# Patient Record
Sex: FEMALE | Race: WHITE | Employment: PART TIME | URBAN - METROPOLITAN AREA
[De-identification: names, ages, dates, MRNs, and addresses within clinical notes are randomized per-mention and may not be internally consistent; named-entity substitution may affect disease eponyms.]

---

## 2019-07-25 ENCOUNTER — APPOINTMENT (OUTPATIENT)
Dept: CT IMAGING | Age: 34
End: 2019-07-25
Payer: COMMERCIAL

## 2019-07-25 ENCOUNTER — HOSPITAL ENCOUNTER (EMERGENCY)
Age: 34
Discharge: HOME | End: 2019-07-25
Payer: COMMERCIAL

## 2019-07-25 VITALS
TEMPERATURE: 97.9 F | RESPIRATION RATE: 15 BRPM | WEIGHT: 205 LBS | OXYGEN SATURATION: 100 % | BODY MASS INDEX: 40.25 KG/M2 | HEIGHT: 60 IN | HEART RATE: 67 BPM | DIASTOLIC BLOOD PRESSURE: 76 MMHG | SYSTOLIC BLOOD PRESSURE: 109 MMHG

## 2019-07-25 DIAGNOSIS — F45.8 HYPERVENTILATION SYNDROME: Primary | ICD-10-CM

## 2019-07-25 LAB
ALBUMIN SERPL-MCNC: 4 G/DL (ref 3.5–4.6)
ALP BLD-CCNC: 70 U/L (ref 40–130)
ALT SERPL-CCNC: 43 U/L (ref 0–33)
ANION GAP SERPL CALCULATED.3IONS-SCNC: 11 MEQ/L (ref 9–15)
AST SERPL-CCNC: 28 U/L (ref 0–35)
BACTERIA: NEGATIVE /HPF
BASOPHILS ABSOLUTE: 0.1 K/UL (ref 0–0.2)
BASOPHILS RELATIVE PERCENT: 1.2 %
BILIRUB SERPL-MCNC: <0.2 MG/DL (ref 0.2–0.7)
BILIRUBIN URINE: NEGATIVE
BLOOD, URINE: ABNORMAL
BUN BLDV-MCNC: 13 MG/DL (ref 6–20)
CALCIUM SERPL-MCNC: 9 MG/DL (ref 8.5–9.9)
CHLORIDE BLD-SCNC: 104 MEQ/L (ref 95–107)
CLARITY: CLEAR
CO2: 24 MEQ/L (ref 20–31)
COLOR: YELLOW
CREAT SERPL-MCNC: 0.62 MG/DL (ref 0.5–0.9)
D DIMER: 0.33 MG/L FEU (ref 0–0.5)
EKG ATRIAL RATE: 72 BPM
EKG P AXIS: 14 DEGREES
EKG P-R INTERVAL: 128 MS
EKG Q-T INTERVAL: 398 MS
EKG QRS DURATION: 82 MS
EKG QTC CALCULATION (BAZETT): 435 MS
EKG R AXIS: 21 DEGREES
EKG T AXIS: 18 DEGREES
EKG VENTRICULAR RATE: 72 BPM
EOSINOPHILS ABSOLUTE: 0.2 K/UL (ref 0–0.7)
EOSINOPHILS RELATIVE PERCENT: 1.6 %
EPITHELIAL CELLS, UA: ABNORMAL /HPF (ref 0–5)
GFR AFRICAN AMERICAN: >60
GFR NON-AFRICAN AMERICAN: >60
GLOBULIN: 3.4 G/DL (ref 2.3–3.5)
GLUCOSE BLD-MCNC: 118 MG/DL (ref 70–99)
GLUCOSE URINE: NEGATIVE MG/DL
HCT VFR BLD CALC: 34.9 % (ref 37–47)
HEMOGLOBIN: 11.6 G/DL (ref 12–16)
HYALINE CASTS: ABNORMAL /HPF (ref 0–5)
KETONES, URINE: NEGATIVE MG/DL
LACTIC ACID: 1.2 MMOL/L (ref 0.5–2.2)
LEUKOCYTE ESTERASE, URINE: NEGATIVE
LYMPHOCYTES ABSOLUTE: 2.5 K/UL (ref 1–4.8)
LYMPHOCYTES RELATIVE PERCENT: 25.4 %
MAGNESIUM: 2.1 MG/DL (ref 1.7–2.4)
MCH RBC QN AUTO: 25.3 PG (ref 27–31.3)
MCHC RBC AUTO-ENTMCNC: 33.1 % (ref 33–37)
MCV RBC AUTO: 76.3 FL (ref 82–100)
MONOCYTES ABSOLUTE: 0.4 K/UL (ref 0.2–0.8)
MONOCYTES RELATIVE PERCENT: 4.4 %
NEUTROPHILS ABSOLUTE: 6.6 K/UL (ref 1.4–6.5)
NEUTROPHILS RELATIVE PERCENT: 67.4 %
NITRITE, URINE: NEGATIVE
PDW BLD-RTO: 14.8 % (ref 11.5–14.5)
PH UA: 6 (ref 5–9)
PLATELET # BLD: 383 K/UL (ref 130–400)
POTASSIUM SERPL-SCNC: 3.8 MEQ/L (ref 3.4–4.9)
PROTEIN UA: 30 MG/DL
RBC # BLD: 4.58 M/UL (ref 4.2–5.4)
RBC UA: ABNORMAL /HPF (ref 0–5)
SODIUM BLD-SCNC: 139 MEQ/L (ref 135–144)
SPECIFIC GRAVITY UA: 1.01 (ref 1–1.03)
TOTAL CK: 145 U/L (ref 0–170)
TOTAL PROTEIN: 7.4 G/DL (ref 6.3–8)
TROPONIN: <0.01 NG/ML (ref 0–0.01)
URINE REFLEX TO CULTURE: YES
UROBILINOGEN, URINE: 0.2 E.U./DL
WBC # BLD: 9.7 K/UL (ref 4.8–10.8)
WBC UA: ABNORMAL /HPF (ref 0–5)

## 2019-07-25 PROCEDURE — 93005 ELECTROCARDIOGRAM TRACING: CPT | Performed by: PHYSICIAN ASSISTANT

## 2019-07-25 PROCEDURE — 99284 EMERGENCY DEPT VISIT MOD MDM: CPT

## 2019-07-25 PROCEDURE — 87086 URINE CULTURE/COLONY COUNT: CPT

## 2019-07-25 PROCEDURE — 81001 URINALYSIS AUTO W/SCOPE: CPT

## 2019-07-25 PROCEDURE — 84484 ASSAY OF TROPONIN QUANT: CPT

## 2019-07-25 PROCEDURE — 82550 ASSAY OF CK (CPK): CPT

## 2019-07-25 PROCEDURE — 6370000000 HC RX 637 (ALT 250 FOR IP): Performed by: EMERGENCY MEDICINE

## 2019-07-25 PROCEDURE — 85025 COMPLETE CBC W/AUTO DIFF WBC: CPT

## 2019-07-25 PROCEDURE — 83605 ASSAY OF LACTIC ACID: CPT

## 2019-07-25 PROCEDURE — 85379 FIBRIN DEGRADATION QUANT: CPT

## 2019-07-25 PROCEDURE — 2580000003 HC RX 258: Performed by: PHYSICIAN ASSISTANT

## 2019-07-25 PROCEDURE — 83735 ASSAY OF MAGNESIUM: CPT

## 2019-07-25 PROCEDURE — 36415 COLL VENOUS BLD VENIPUNCTURE: CPT

## 2019-07-25 PROCEDURE — 80053 COMPREHEN METABOLIC PANEL: CPT

## 2019-07-25 PROCEDURE — 70450 CT HEAD/BRAIN W/O DYE: CPT

## 2019-07-25 RX ORDER — LORAZEPAM 1 MG/1
1 TABLET ORAL ONCE
Status: COMPLETED | OUTPATIENT
Start: 2019-07-25 | End: 2019-07-25

## 2019-07-25 RX ORDER — 0.9 % SODIUM CHLORIDE 0.9 %
1000 INTRAVENOUS SOLUTION INTRAVENOUS ONCE
Status: COMPLETED | OUTPATIENT
Start: 2019-07-25 | End: 2019-07-25

## 2019-07-25 RX ORDER — KETOROLAC TROMETHAMINE 10 MG/1
10 TABLET, FILM COATED ORAL EVERY 6 HOURS PRN
Qty: 20 TABLET | Refills: 0 | Status: SHIPPED | OUTPATIENT
Start: 2019-07-25

## 2019-07-25 RX ADMIN — SODIUM CHLORIDE 1000 ML: 9 INJECTION, SOLUTION INTRAVENOUS at 01:13

## 2019-07-25 RX ADMIN — LORAZEPAM 1 MG: 1 TABLET ORAL at 02:20

## 2019-07-25 SDOH — HEALTH STABILITY: MENTAL HEALTH: HOW OFTEN DO YOU HAVE A DRINK CONTAINING ALCOHOL?: NEVER

## 2019-07-25 ASSESSMENT — ENCOUNTER SYMPTOMS
EYE PAIN: 0
ALLERGIC/IMMUNOLOGIC NEGATIVE: 1
CHEST TIGHTNESS: 1
ABDOMINAL PAIN: 0
COLOR CHANGE: 0
APNEA: 0
TROUBLE SWALLOWING: 0
SHORTNESS OF BREATH: 1

## 2019-07-26 LAB — URINE CULTURE, ROUTINE: NORMAL

## 2019-07-26 PROCEDURE — 93010 ELECTROCARDIOGRAM REPORT: CPT | Performed by: INTERNAL MEDICINE

## 2023-02-21 PROBLEM — K63.8219 SMALL INTESTINAL BACTERIAL OVERGROWTH: Status: ACTIVE | Noted: 2023-02-21

## 2023-02-21 PROBLEM — R13.10 DYSPHAGIA: Status: ACTIVE | Noted: 2023-02-21

## 2023-02-21 PROBLEM — R42 DIZZINESS: Status: ACTIVE | Noted: 2023-02-21

## 2023-02-21 PROBLEM — K21.9 GERD (GASTROESOPHAGEAL REFLUX DISEASE): Status: ACTIVE | Noted: 2023-02-21

## 2023-02-21 PROBLEM — D50.9 IRON DEFICIENCY ANEMIA: Status: ACTIVE | Noted: 2023-02-21

## 2023-02-21 PROBLEM — R05.9 COUGH: Status: ACTIVE | Noted: 2023-02-21

## 2023-02-21 PROBLEM — D64.9 ANEMIA: Status: ACTIVE | Noted: 2023-02-21

## 2023-02-21 PROBLEM — R10.30 LOWER ABDOMINAL PAIN: Status: ACTIVE | Noted: 2023-02-21

## 2023-02-21 PROBLEM — R06.02 SHORTNESS OF BREATH: Status: ACTIVE | Noted: 2023-02-21

## 2023-02-21 PROBLEM — J02.9 SORETHROAT: Status: ACTIVE | Noted: 2023-02-21

## 2023-02-21 PROBLEM — R09.89 THROAT TIGHTNESS: Status: ACTIVE | Noted: 2023-02-21

## 2023-02-21 PROBLEM — N39.0 UTI (URINARY TRACT INFECTION): Status: ACTIVE | Noted: 2023-02-21

## 2023-02-21 PROBLEM — F41.9 ANXIETY: Status: ACTIVE | Noted: 2023-02-21

## 2023-02-21 PROBLEM — R53.83 FATIGUE: Status: ACTIVE | Noted: 2023-02-21

## 2023-02-21 PROBLEM — J30.9 ALLERGIC RHINITIS: Status: ACTIVE | Noted: 2023-02-21

## 2023-02-21 PROBLEM — N93.8 DUB (DYSFUNCTIONAL UTERINE BLEEDING): Status: ACTIVE | Noted: 2023-02-21

## 2023-02-21 PROBLEM — J01.90 ACUTE SINUSITIS: Status: ACTIVE | Noted: 2023-02-21

## 2023-02-21 PROBLEM — J03.90 TONSILLITIS: Status: ACTIVE | Noted: 2023-02-21

## 2023-02-21 PROBLEM — G47.33 OBSTRUCTIVE SLEEP APNEA, ADULT: Status: ACTIVE | Noted: 2023-02-21

## 2023-02-21 PROBLEM — E55.9 VITAMIN D DEFICIENCY: Status: ACTIVE | Noted: 2023-02-21

## 2023-02-21 PROBLEM — R19.7 DIARRHEA: Status: ACTIVE | Noted: 2023-02-21

## 2023-02-21 PROBLEM — J45.909 ASTHMA (HHS-HCC): Status: ACTIVE | Noted: 2023-02-21

## 2023-02-21 RX ORDER — VIT C/E/ZN/COPPR/LUTEIN/ZEAXAN 250MG-90MG
1 CAPSULE ORAL DAILY
COMMUNITY
Start: 2022-11-15 | End: 2023-05-05 | Stop reason: SDUPTHER

## 2023-02-21 RX ORDER — ALBUTEROL SULFATE 90 UG/1
1-2 AEROSOL, METERED RESPIRATORY (INHALATION)
COMMUNITY
Start: 2021-08-16 | End: 2023-08-03 | Stop reason: SDUPTHER

## 2023-02-21 RX ORDER — ESCITALOPRAM OXALATE 10 MG/1
1 TABLET ORAL
COMMUNITY
Start: 2022-07-19 | End: 2023-10-30 | Stop reason: SDUPTHER

## 2023-02-21 RX ORDER — BENZONATATE 100 MG/1
100 CAPSULE ORAL 4 TIMES DAILY PRN
COMMUNITY
Start: 2022-06-21 | End: 2023-10-30 | Stop reason: SDUPTHER

## 2023-02-21 RX ORDER — NORETHINDRONE ACETATE AND ETHINYL ESTRADIOL .02; 1 MG/1; MG/1
1 TABLET ORAL DAILY
COMMUNITY
Start: 2021-07-22 | End: 2023-03-16 | Stop reason: SDUPTHER

## 2023-02-21 RX ORDER — PANTOPRAZOLE SODIUM 40 MG/1
1 TABLET, DELAYED RELEASE ORAL DAILY
COMMUNITY
Start: 2021-10-18 | End: 2023-08-03 | Stop reason: SDUPTHER

## 2023-02-21 RX ORDER — FLUTICASONE PROPIONATE AND SALMETEROL 500; 50 UG/1; UG/1
1 POWDER RESPIRATORY (INHALATION)
COMMUNITY
Start: 2022-07-19 | End: 2023-04-17 | Stop reason: SDUPTHER

## 2023-02-21 RX ORDER — ACETAMINOPHEN 500 MG
2 TABLET ORAL
COMMUNITY
Start: 2022-11-15

## 2023-02-21 RX ORDER — ALBUTEROL SULFATE 0.83 MG/ML
SOLUTION RESPIRATORY (INHALATION)
COMMUNITY
Start: 2022-07-19 | End: 2023-09-30 | Stop reason: SDUPTHER

## 2023-03-13 DIAGNOSIS — N93.8 DUB (DYSFUNCTIONAL UTERINE BLEEDING): ICD-10-CM

## 2023-03-16 RX ORDER — NORETHINDRONE ACETATE AND ETHINYL ESTRADIOL .02; 1 MG/1; MG/1
1 TABLET ORAL DAILY
Qty: 21 TABLET | Refills: 6 | Status: SHIPPED | OUTPATIENT
Start: 2023-03-16 | End: 2023-06-13 | Stop reason: SDUPTHER

## 2023-03-31 ENCOUNTER — OFFICE VISIT (OUTPATIENT)
Dept: PRIMARY CARE | Facility: CLINIC | Age: 38
End: 2023-03-31
Payer: COMMERCIAL

## 2023-03-31 VITALS
SYSTOLIC BLOOD PRESSURE: 122 MMHG | WEIGHT: 203 LBS | BODY MASS INDEX: 37.36 KG/M2 | OXYGEN SATURATION: 98 % | RESPIRATION RATE: 16 BRPM | DIASTOLIC BLOOD PRESSURE: 76 MMHG | HEIGHT: 62 IN | HEART RATE: 86 BPM

## 2023-03-31 DIAGNOSIS — J45.909 ASTHMA, UNSPECIFIED ASTHMA SEVERITY, UNSPECIFIED WHETHER COMPLICATED, UNSPECIFIED WHETHER PERSISTENT (HHS-HCC): Primary | ICD-10-CM

## 2023-03-31 DIAGNOSIS — Z01.419 GYNECOLOGIC EXAM NORMAL: ICD-10-CM

## 2023-03-31 DIAGNOSIS — N93.8 DUB (DYSFUNCTIONAL UTERINE BLEEDING): ICD-10-CM

## 2023-03-31 DIAGNOSIS — E66.01 OBESITY, CLASS III, BMI 40-49.9 (MORBID OBESITY) (MULTI): ICD-10-CM

## 2023-03-31 DIAGNOSIS — K21.9 GASTROESOPHAGEAL REFLUX DISEASE, UNSPECIFIED WHETHER ESOPHAGITIS PRESENT: ICD-10-CM

## 2023-03-31 DIAGNOSIS — G47.33 OBSTRUCTIVE SLEEP APNEA, ADULT: ICD-10-CM

## 2023-03-31 PROBLEM — E66.813 OBESITY, CLASS III, BMI 40-49.9 (MORBID OBESITY): Status: ACTIVE | Noted: 2018-04-23

## 2023-03-31 PROBLEM — D64.9 ANEMIA: Status: RESOLVED | Noted: 2023-02-21 | Resolved: 2023-03-31

## 2023-03-31 PROBLEM — R05.9 COUGH: Status: RESOLVED | Noted: 2023-02-21 | Resolved: 2023-03-31

## 2023-03-31 PROBLEM — N39.0 UTI (URINARY TRACT INFECTION): Status: RESOLVED | Noted: 2023-02-21 | Resolved: 2023-03-31

## 2023-03-31 PROBLEM — R42 DIZZINESS: Status: RESOLVED | Noted: 2023-02-21 | Resolved: 2023-03-31

## 2023-03-31 PROBLEM — J03.90 TONSILLITIS: Status: RESOLVED | Noted: 2023-02-21 | Resolved: 2023-03-31

## 2023-03-31 PROBLEM — J01.90 ACUTE SINUSITIS: Status: RESOLVED | Noted: 2023-02-21 | Resolved: 2023-03-31

## 2023-03-31 PROBLEM — K63.8219 SMALL INTESTINAL BACTERIAL OVERGROWTH: Status: RESOLVED | Noted: 2023-02-21 | Resolved: 2023-03-31

## 2023-03-31 PROBLEM — R10.30 LOWER ABDOMINAL PAIN: Status: RESOLVED | Noted: 2023-02-21 | Resolved: 2023-03-31

## 2023-03-31 PROBLEM — R19.7 DIARRHEA: Status: RESOLVED | Noted: 2023-02-21 | Resolved: 2023-03-31

## 2023-03-31 PROBLEM — J02.9 SORETHROAT: Status: RESOLVED | Noted: 2023-02-21 | Resolved: 2023-03-31

## 2023-03-31 PROBLEM — R06.02 SHORTNESS OF BREATH: Status: RESOLVED | Noted: 2023-02-21 | Resolved: 2023-03-31

## 2023-03-31 PROBLEM — R09.89 THROAT TIGHTNESS: Status: RESOLVED | Noted: 2023-02-21 | Resolved: 2023-03-31

## 2023-03-31 PROCEDURE — 99214 OFFICE O/P EST MOD 30 MIN: CPT | Performed by: FAMILY MEDICINE

## 2023-03-31 RX ORDER — TIOTROPIUM BROMIDE 18 UG/1
1 CAPSULE ORAL; RESPIRATORY (INHALATION)
Qty: 30 CAPSULE | Refills: 3 | Status: SHIPPED | OUTPATIENT
Start: 2023-03-31 | End: 2023-08-14 | Stop reason: SDUPTHER

## 2023-03-31 RX ORDER — ALPRAZOLAM 0.25 MG/1
0.25 TABLET ORAL 3 TIMES DAILY PRN
COMMUNITY
Start: 2022-07-29

## 2023-03-31 ASSESSMENT — ENCOUNTER SYMPTOMS
CHEST TIGHTNESS: 1
COUGH: 1
SHORTNESS OF BREATH: 1

## 2023-03-31 NOTE — PROGRESS NOTES
Subjective   Patient ID: Keri Lowe is a 37 y.o. female who presents for Asthma (Follow up. Patient c/o flare up over the last couple weeks with SOB on exertion.).    Asthma  She complains of chest tightness, cough and shortness of breath. This is a chronic problem. The current episode started more than 1 month ago. Her past medical history is significant for asthma.        Review of Systems   Respiratory:  Positive for cough and shortness of breath.      12 Systems have been reviewed as follows.  Constitutional: Fever, weight gain, weight loss, appetite change, night sweats, fatigue, chills.  Eyes : blurry, double vision, vision, loss, tearing, redness, pain, sensitivity to light, glaucoma.  Ears, nose, mouth, and throat: Hearing loss, ringing in the ears, ear pain, nasal congestion, nasal drainage, nosebleeds, mouth, throat, irritation tooth problem.  Cardiovascular :chest pain, pressure, heart racing, palpitations, sweating, leg swelling, high or low blood pressure  Pulmonary: Cough, yellow or green sputum, blood and sputum, shortness of breath, wheezing  Gastrointestinal: Nausea, vomiting, diarrhea, constipation, pain, blood in stool, or vomitus, heartburn, difficulty swallowing  Genitourinary: incontinence, abnormal bleeding, abnormal discharge, urinary frequency, urinary hesitancy, pain, impotence sexual problem, infection, urinary retention  Musculoskeletal: Pain, stiffness, joint, redness or warmth, arthritis, back pain, weakness, muscle wasting, sprain or fracture  Neuro: Weight weakness, dizziness, change in voice, change in taste change in vision, change in hearing, loss, or change of sensation, trouble walking, balance problems coordination problems, shaking, speech problem  Endocrine , cold or heat intolerance, blood sugar problem, weight gain or loss missed periods hot flashes, sweats, change in body hair, change in libido, increased thirst, increased urination  Heme/lymph: Swelling, bleeding,  "problem anemia, bruising, enlarged lymph nodes  Allergic/immunologic: H. plus nasal drip, watery itchy eyes, nasal drainage, immunosuppressed  The above were reviewed and noted negative except as noted in HPI and Problem List.      Objective   /76   Pulse 86   Resp 16   Ht 1.575 m (5' 2\")   Wt 92.1 kg (203 lb)   SpO2 98%   BMI 37.13 kg/m²     Physical Exam  Constitutional: Well developed, well nourished, alert and in no acute distress   Eyes: Normal external exam. Pupils equally round and reactive to light with normal accommodation and extraocular movements intact.  Neck: Supple, no lymphadenopathy or masses.   Cardiovascular: Regular rate and rhythm, normal S1 and S2, no murmurs, gallops, or rubs. Radial pulses normal. No peripheral edema.  Pulmonary: No respiratory distress, lungs clear to auscultation bilaterally. No wheezes, rhonchi, rales.  Abdomen: soft,non tender, non distended, without masses or HSM  Skin: Warm, well perfused, normal skin turgor and color.   Neurologic: Cranial nerves II-XII grossly intact.   Psychiatric: Mood calm and affect normal  Musculoskeletal: Moving all extremities without restriction      Assessment/Plan   Problem List Items Addressed This Visit          Nervous    Obstructive sleep apnea, adult       Respiratory    Asthma - Primary    Relevant Medications    tiotropium (Spiriva) 18 mcg inhalation capsule    Other Relevant Orders    Follow Up In [facility] - PCP       Digestive    GERD (gastroesophageal reflux disease)    Relevant Orders    Follow Up In [facility] - PCP       Genitourinary    DUB (dysfunctional uterine bleeding)       Endocrine/Metabolic    Obesity, Class III, BMI 40-49.9 (morbid obesity) (CMS/HCC)     Other Visit Diagnoses       Gynecologic exam normal        Relevant Orders    Referral to Obstetrics / Gynecology           Start  spiriva    seeing Dr. Lea   for iron def anemia     xanax prn     consider pelvic u/s. not needed at " this time due to pelvic symptoms being resolved      using humidifier       get home sleep study in future    Consider adding singular next

## 2023-04-17 DIAGNOSIS — J45.909 ASTHMA, UNSPECIFIED ASTHMA SEVERITY, UNSPECIFIED WHETHER COMPLICATED, UNSPECIFIED WHETHER PERSISTENT (HHS-HCC): Primary | ICD-10-CM

## 2023-04-17 RX ORDER — FLUTICASONE PROPIONATE AND SALMETEROL 500; 50 UG/1; UG/1
1 POWDER RESPIRATORY (INHALATION)
Qty: 60 EACH | Refills: 3 | Status: SHIPPED | OUTPATIENT
Start: 2023-04-17 | End: 2023-09-15 | Stop reason: SDUPTHER

## 2023-04-17 NOTE — TELEPHONE ENCOUNTER
Dr Bell pt    Pt phoned office and is requesting refill on    Advair Diskus    [facility]

## 2023-05-05 ENCOUNTER — APPOINTMENT (OUTPATIENT)
Dept: PRIMARY CARE | Facility: CLINIC | Age: 38
End: 2023-05-05
Payer: COMMERCIAL

## 2023-05-05 DIAGNOSIS — E55.9 VITAMIN D DEFICIENCY: ICD-10-CM

## 2023-05-05 RX ORDER — VIT C/E/ZN/COPPR/LUTEIN/ZEAXAN 250MG-90MG
25 CAPSULE ORAL DAILY
Qty: 90 CAPSULE | Refills: 0 | Status: SHIPPED | OUTPATIENT
Start: 2023-05-05 | End: 2023-08-03 | Stop reason: SDUPTHER

## 2023-05-17 ENCOUNTER — OFFICE VISIT (OUTPATIENT)
Dept: PRIMARY CARE | Facility: CLINIC | Age: 38
End: 2023-05-17
Payer: COMMERCIAL

## 2023-05-17 VITALS
RESPIRATION RATE: 16 BRPM | HEIGHT: 60 IN | HEART RATE: 94 BPM | OXYGEN SATURATION: 95 % | DIASTOLIC BLOOD PRESSURE: 74 MMHG | WEIGHT: 205 LBS | BODY MASS INDEX: 40.25 KG/M2 | SYSTOLIC BLOOD PRESSURE: 108 MMHG

## 2023-05-17 DIAGNOSIS — E55.9 VITAMIN D DEFICIENCY: ICD-10-CM

## 2023-05-17 DIAGNOSIS — E78.5 DYSLIPIDEMIA: ICD-10-CM

## 2023-05-17 DIAGNOSIS — K21.9 GASTROESOPHAGEAL REFLUX DISEASE, UNSPECIFIED WHETHER ESOPHAGITIS PRESENT: ICD-10-CM

## 2023-05-17 DIAGNOSIS — E66.01 OBESITY, CLASS III, BMI 40-49.9 (MORBID OBESITY) (MULTI): Primary | ICD-10-CM

## 2023-05-17 DIAGNOSIS — J45.909 ASTHMA, UNSPECIFIED ASTHMA SEVERITY, UNSPECIFIED WHETHER COMPLICATED, UNSPECIFIED WHETHER PERSISTENT (HHS-HCC): ICD-10-CM

## 2023-05-17 DIAGNOSIS — D50.8 OTHER IRON DEFICIENCY ANEMIA: ICD-10-CM

## 2023-05-17 DIAGNOSIS — J30.1 SEASONAL ALLERGIC RHINITIS DUE TO POLLEN: ICD-10-CM

## 2023-05-17 PROCEDURE — 99214 OFFICE O/P EST MOD 30 MIN: CPT | Performed by: FAMILY MEDICINE

## 2023-05-17 NOTE — PROGRESS NOTES
Subjective   Patient ID: Keri Lowe is a 37 y.o. female who presents for Follow-up.    She complains of chest tightness, cough and shortness of breath. This is a chronic problem. The current episode started more than 1 month ago. Her past medical history is significant for asthma.    Would like to discuss chinese herbal medicine for nausea.     Asthma  The problem occurs constantly. Her symptoms are alleviated by steroid inhaler. She reports minimal improvement on treatment. Her past medical history is significant for asthma.        Review of Systems  12 Systems have been reviewed as follows.  Constitutional: Fever, weight gain, weight loss, appetite change, night sweats, fatigue, chills.  Eyes : blurry, double vision, vision, loss, tearing, redness, pain, sensitivity to light, glaucoma.  Ears, nose, mouth, and throat: Hearing loss, ringing in the ears, ear pain, nasal congestion, nasal drainage, nosebleeds, mouth, throat, irritation tooth problem.  Cardiovascular :chest pain, pressure, heart racing, palpitations, sweating, leg swelling, high or low blood pressure  Pulmonary: Cough, yellow or green sputum, blood and sputum, shortness of breath, wheezing  Gastrointestinal: Nausea, vomiting, diarrhea, constipation, pain, blood in stool, or vomitus, heartburn, difficulty swallowing  Genitourinary: incontinence, abnormal bleeding, abnormal discharge, urinary frequency, urinary hesitancy, pain, impotence sexual problem, infection, urinary retention  Musculoskeletal: Pain, stiffness, joint, redness or warmth, arthritis, back pain, weakness, muscle wasting, sprain or fracture  Neuro: Weight weakness, dizziness, change in voice, change in taste change in vision, change in hearing, loss, or change of sensation, trouble walking, balance problems coordination problems, shaking, speech problem  Endocrine , cold or heat intolerance, blood sugar problem, weight gain or loss missed periods hot flashes, sweats, change in body  hair, change in libido, increased thirst, increased urination  Heme/lymph: Swelling, bleeding, problem anemia, bruising, enlarged lymph nodes  Allergic/immunologic: H. plus nasal drip, watery itchy eyes, nasal drainage, immunosuppressed  The above were reviewed and noted negative except as noted in HPI and Problem List.    Objective   /74   Pulse 94   Resp 16   Ht 1.524 m (5')   Wt 93 kg (205 lb)   SpO2 95%   BMI 40.04 kg/m²     Physical Exam  Constitutional: Well developed, well nourished, alert and in no acute distress   Eyes: Normal external exam. Pupils equally round and reactive to light with normal accommodation and extraocular movements intact.  Neck: Supple, no lymphadenopathy or masses.   Cardiovascular: Regular rate and rhythm, normal S1 and S2, no murmurs, gallops, or rubs. Radial pulses normal. No peripheral edema.  Pulmonary: No respiratory distress, lungs clear to auscultation bilaterally. No wheezes, rhonchi, rales.  Abdomen: soft,non tender, non distended, without masses or HSM  Skin: Warm, well perfused, normal skin turgor and color.   Neurologic: Cranial nerves II-XII grossly intact.   Psychiatric: Mood calm and affect normal  Musculoskeletal: Moving all extremities without restriction    Assessment/Plan   Problem List Items Addressed This Visit          Respiratory    Asthma    Relevant Orders    Follow Up In [facility] - PCP       Digestive    GERD (gastroesophageal reflux disease)    Relevant Orders    Follow Up In [facility] - PCP       Endocrine/Metabolic    Vitamin D deficiency    Relevant Orders    Vitamin D, Total    Obesity, Class III, BMI 40-49.9 (morbid obesity) (CMS/[facility]) - Primary    Relevant Orders    Follow Up In [facility] - PCP       Hematologic    Iron deficiency anemia    Relevant Orders    CBC and Auto Differential    Comprehensive Metabolic Panel    Iron and TIBC       Other    Allergic rhinitis     Other Visit Diagnoses        Dyslipidemia        Relevant Orders    Lipid Panel            Continue current medications and therapy for chronic medical conditions         seeing Dr. Lea   for iron def anemia     xanax prn      consider pelvic u/s. not needed at this time due to pelvic symptoms being resolved      using humidifier       get home sleep study in future     Consider adding singular next   Declines today

## 2023-06-13 DIAGNOSIS — N93.8 DUB (DYSFUNCTIONAL UTERINE BLEEDING): ICD-10-CM

## 2023-06-13 RX ORDER — NORETHINDRONE ACETATE AND ETHINYL ESTRADIOL .02; 1 MG/1; MG/1
1 TABLET ORAL DAILY
Qty: 21 TABLET | Refills: 6 | Status: SHIPPED | OUTPATIENT
Start: 2023-06-13 | End: 2023-08-25 | Stop reason: SDUPTHER

## 2023-06-13 NOTE — TELEPHONE ENCOUNTER
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

## 2023-08-03 DIAGNOSIS — F41.9 ANXIETY: ICD-10-CM

## 2023-08-03 DIAGNOSIS — E55.9 VITAMIN D DEFICIENCY: ICD-10-CM

## 2023-08-03 DIAGNOSIS — K21.9 GASTROESOPHAGEAL REFLUX DISEASE, UNSPECIFIED WHETHER ESOPHAGITIS PRESENT: ICD-10-CM

## 2023-08-03 DIAGNOSIS — J30.1 SEASONAL ALLERGIC RHINITIS DUE TO POLLEN: ICD-10-CM

## 2023-08-03 DIAGNOSIS — J45.909 ASTHMA, UNSPECIFIED ASTHMA SEVERITY, UNSPECIFIED WHETHER COMPLICATED, UNSPECIFIED WHETHER PERSISTENT (HHS-HCC): ICD-10-CM

## 2023-08-03 NOTE — TELEPHONE ENCOUNTER
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

## 2023-08-04 RX ORDER — VIT C/E/ZN/COPPR/LUTEIN/ZEAXAN 250MG-90MG
25 CAPSULE ORAL DAILY
Qty: 90 CAPSULE | Refills: 0 | Status: SHIPPED | OUTPATIENT
Start: 2023-08-04

## 2023-08-04 RX ORDER — ALBUTEROL SULFATE 90 UG/1
1-2 AEROSOL, METERED RESPIRATORY (INHALATION)
Qty: 18 G | Refills: 1 | Status: SHIPPED | OUTPATIENT
Start: 2023-08-04

## 2023-08-04 RX ORDER — PANTOPRAZOLE SODIUM 40 MG/1
40 TABLET, DELAYED RELEASE ORAL DAILY
Qty: 90 TABLET | Refills: 1 | Status: SHIPPED | OUTPATIENT
Start: 2023-08-04

## 2023-08-14 ENCOUNTER — TELEPHONE (OUTPATIENT)
Dept: PRIMARY CARE | Facility: CLINIC | Age: 38
End: 2023-08-14
Payer: COMMERCIAL

## 2023-08-14 DIAGNOSIS — J45.909 ASTHMA, UNSPECIFIED ASTHMA SEVERITY, UNSPECIFIED WHETHER COMPLICATED, UNSPECIFIED WHETHER PERSISTENT (HHS-HCC): ICD-10-CM

## 2023-08-15 RX ORDER — TIOTROPIUM BROMIDE 18 UG/1
1 CAPSULE ORAL; RESPIRATORY (INHALATION)
Qty: 30 CAPSULE | Refills: 3 | Status: SHIPPED | OUTPATIENT
Start: 2023-08-15 | End: 2024-01-25 | Stop reason: SDUPTHER

## 2023-08-25 DIAGNOSIS — N93.8 DUB (DYSFUNCTIONAL UTERINE BLEEDING): ICD-10-CM

## 2023-08-25 RX ORDER — NORETHINDRONE ACETATE AND ETHINYL ESTRADIOL .02; 1 MG/1; MG/1
1 TABLET ORAL DAILY
Qty: 21 TABLET | Refills: 6 | Status: SHIPPED | OUTPATIENT
Start: 2023-08-25 | End: 2024-01-26 | Stop reason: WASHOUT

## 2023-08-25 NOTE — TELEPHONE ENCOUNTER
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

## 2023-09-15 DIAGNOSIS — J45.909 ASTHMA, UNSPECIFIED ASTHMA SEVERITY, UNSPECIFIED WHETHER COMPLICATED, UNSPECIFIED WHETHER PERSISTENT (HHS-HCC): ICD-10-CM

## 2023-09-15 RX ORDER — FLUTICASONE PROPIONATE AND SALMETEROL 500; 50 UG/1; UG/1
1 POWDER RESPIRATORY (INHALATION)
Qty: 60 EACH | Refills: 3 | Status: SHIPPED | OUTPATIENT
Start: 2023-09-15 | End: 2024-02-14 | Stop reason: SDUPTHER

## 2023-09-29 DIAGNOSIS — J45.909 ASTHMA, UNSPECIFIED ASTHMA SEVERITY, UNSPECIFIED WHETHER COMPLICATED, UNSPECIFIED WHETHER PERSISTENT (HHS-HCC): ICD-10-CM

## 2023-09-29 NOTE — TELEPHONE ENCOUNTER
Dr. Bell patient  Refill for Albuterol Sulfate 0.083% Nebulizer Solution  [id number] [address] [facility]

## 2023-10-01 RX ORDER — ALBUTEROL SULFATE 0.83 MG/ML
SOLUTION RESPIRATORY (INHALATION)
Qty: 75 ML | Refills: 3 | Status: SHIPPED | OUTPATIENT
Start: 2023-10-01 | End: 2024-01-25 | Stop reason: SDUPTHER

## 2023-10-30 DIAGNOSIS — J45.909 ASTHMA, UNSPECIFIED ASTHMA SEVERITY, UNSPECIFIED WHETHER COMPLICATED, UNSPECIFIED WHETHER PERSISTENT (HHS-HCC): ICD-10-CM

## 2023-10-30 DIAGNOSIS — F41.9 ANXIETY: ICD-10-CM

## 2023-10-30 DIAGNOSIS — J30.1 SEASONAL ALLERGIC RHINITIS DUE TO POLLEN: ICD-10-CM

## 2023-10-30 RX ORDER — ESCITALOPRAM OXALATE 10 MG/1
10 TABLET ORAL
Qty: 90 TABLET | Refills: 0 | Status: SHIPPED | OUTPATIENT
Start: 2023-10-30 | End: 2024-03-26 | Stop reason: SDUPTHER

## 2023-10-30 RX ORDER — BENZONATATE 100 MG/1
100 CAPSULE ORAL 4 TIMES DAILY PRN
Qty: 20 CAPSULE | Refills: 1 | Status: SHIPPED | OUTPATIENT
Start: 2023-10-30 | End: 2024-01-26 | Stop reason: ALTCHOICE

## 2023-10-30 NOTE — TELEPHONE ENCOUNTER
REFILL ON loratadine 10 mg capsule   escitalopram (Lexapro) 10 mg   benzonatate (Tessalon) 100 mg   [facility]  [address], [place] [address]  IS SCHEDULED FOR 11/14

## 2023-11-13 ENCOUNTER — TELEMEDICINE (OUTPATIENT)
Dept: PRIMARY CARE | Facility: CLINIC | Age: 38
End: 2023-11-13
Payer: COMMERCIAL

## 2023-11-13 DIAGNOSIS — J40 BRONCHITIS: ICD-10-CM

## 2023-11-13 DIAGNOSIS — N93.8 DUB (DYSFUNCTIONAL UTERINE BLEEDING): ICD-10-CM

## 2023-11-13 DIAGNOSIS — K21.9 GASTROESOPHAGEAL REFLUX DISEASE, UNSPECIFIED WHETHER ESOPHAGITIS PRESENT: ICD-10-CM

## 2023-11-13 DIAGNOSIS — J01.10 ACUTE NON-RECURRENT FRONTAL SINUSITIS: Primary | ICD-10-CM

## 2023-11-13 DIAGNOSIS — G47.33 OBSTRUCTIVE SLEEP APNEA, ADULT: ICD-10-CM

## 2023-11-13 PROCEDURE — 99214 OFFICE O/P EST MOD 30 MIN: CPT | Performed by: FAMILY MEDICINE

## 2023-11-13 RX ORDER — CEFDINIR 300 MG/1
300 CAPSULE ORAL 2 TIMES DAILY
Qty: 20 CAPSULE | Refills: 0 | Status: SHIPPED | OUTPATIENT
Start: 2023-11-13 | End: 2023-11-23

## 2023-11-13 RX ORDER — NORETHINDRONE ACETATE AND ETHINYL ESTRADIOL 1.5-30(21)
1 KIT ORAL DAILY
Qty: 84 TABLET | Refills: 1 | Status: SHIPPED | OUTPATIENT
Start: 2023-11-13 | End: 2024-01-25 | Stop reason: SDUPTHER

## 2023-11-13 RX ORDER — PREDNISONE 10 MG/1
TABLET ORAL
Qty: 30 TABLET | Refills: 0 | Status: SHIPPED | OUTPATIENT
Start: 2023-11-13 | End: 2024-01-26 | Stop reason: ALTCHOICE

## 2023-11-13 ASSESSMENT — ENCOUNTER SYMPTOMS
COUGH: 1
SORE THROAT: 1

## 2023-11-13 NOTE — PROGRESS NOTES
Subjective   Patient ID: Keri Lowe is a 38 y.o. female who presents for Cough, Nasal Congestion, and Asthma.    HPI Patient is needing to discuss treatment of her bronchitis. She ahs a cold about 1 months ago and did resolve . She started getting a return cough, fever, nasal congestion a week ago and on 11/7/23 she had a teledoc visit., They prescribed doxycycline 100 mg and Prednisone. She has her last antibiotic today and is still having cough and nasal drainage  at night. She has not tested for Covid .    She would like to also discuss her birth control medication. She is on Abdulaziz but med list has Junel. She is starting to have early periods and would like to change medication.     Review of Systems   HENT:  Positive for congestion and sore throat.    Respiratory:  Positive for cough.      12 Systems have been reviewed as follows.  Constitutional: Fever, weight gain, weight loss, appetite change, night sweats, fatigue, chills.  Eyes : blurry, double vision, vision, loss, tearing, redness, pain, sensitivity to light, glaucoma.  Ears, nose, mouth, and throat: Hearing loss, ringing in the ears, ear pain, nasal congestion, nasal drainage, nosebleeds, mouth, throat, irritation tooth problem.  Cardiovascular :chest pain, pressure, heart racing, palpitations, sweating, leg swelling, high or low blood pressure  Pulmonary: Cough, yellow or green sputum, blood and sputum, shortness of breath, wheezing  Gastrointestinal: Nausea, vomiting, diarrhea, constipation, pain, blood in stool, or vomitus, heartburn, difficulty swallowing  Genitourinary: incontinence, abnormal bleeding, abnormal discharge, urinary frequency, urinary hesitancy, pain, impotence sexual problem, infection, urinary retention  Musculoskeletal: Pain, stiffness, joint, redness or warmth, arthritis, back pain, weakness, muscle wasting, sprain or fracture  Neuro: Weight weakness, dizziness, change in voice, change in taste change in vision, change in  hearing, loss, or change of sensation, trouble walking, balance problems coordination problems, shaking, speech problem  Endocrine , cold or heat intolerance, blood sugar problem, weight gain or loss missed periods hot flashes, sweats, change in body hair, change in libido, increased thirst, increased urination  Heme/lymph: Swelling, bleeding, problem anemia, bruising, enlarged lymph nodes  Allergic/immunologic: H. plus nasal drip, watery itchy eyes, nasal drainage, immunosuppressed  The above were reviewed and noted negative except as noted in HPI and Problem List.      Objective   There were no vitals taken for this visit.    Physical Exam  Constitutional: Well developed, well nourished, alert and in no acute distress   Psychiatric: Mood calm and affect normal        Assessment/Plan   Problem List Items Addressed This Visit             ICD-10-CM    DUB (dysfunctional uterine bleeding) N93.8    Relevant Medications    norethindrone-e.estradioL-iron (Microgestin FE 1.5/30) 1.5 mg-30 mcg (21)/75 mg (7) tablet    Other Relevant Orders    Follow Up In [facility] - PCP - Established    GERD (gastroesophageal reflux disease) K21.9    Relevant Orders    Follow Up In [facility] - PCP - Established    Obstructive sleep apnea, adult G47.33    Relevant Orders    Follow Up In [facility] - PCP - Established     Other Visit Diagnoses         Codes    Acute non-recurrent frontal sinusitis    -  Primary J01.10    Relevant Medications    predniSONE (Deltasone) 10 mg tablet    cefdinir (Omnicef) 300 mg capsule    Other Relevant Orders    Follow Up In [facility] - PCP - Established    Bronchitis     J40    Relevant Medications    cefdinir (Omnicef) 300 mg capsule    Other Relevant Orders    Follow Up In [facility] - PCP - Established        Cont spiriva    Virtual Visit - Audio and Visual Communication Real Time       seeing Dr. Lea   for iron def anemia     xanax prn       consider pelvic u/s. not needed at this time due to pelvic symptoms being resolved      using humidifier       get home sleep study in future     Consider adding singular next       Continue current medications and therapy for chronic medical conditions.    Patient was advised importance of proper diet/nutrition in addition adequate hydration. Patient was encouraged moderate exercise program to include 30 minutes daily for 5 days of the week or 150 minutes weekly. Patient will follow-up with us as scheduled.

## 2024-01-22 ENCOUNTER — LAB (OUTPATIENT)
Dept: LAB | Facility: LAB | Age: 39
End: 2024-01-22
Payer: COMMERCIAL

## 2024-01-22 DIAGNOSIS — E78.5 DYSLIPIDEMIA: ICD-10-CM

## 2024-01-22 DIAGNOSIS — D50.8 OTHER IRON DEFICIENCY ANEMIA: ICD-10-CM

## 2024-01-22 DIAGNOSIS — E55.9 VITAMIN D DEFICIENCY: ICD-10-CM

## 2024-01-22 LAB
25(OH)D3 SERPL-MCNC: 26 NG/ML (ref 30–100)
ALBUMIN SERPL BCP-MCNC: 4.2 G/DL (ref 3.4–5)
ALP SERPL-CCNC: 54 U/L (ref 33–110)
ALT SERPL W P-5'-P-CCNC: 41 U/L (ref 7–45)
ANION GAP SERPL CALC-SCNC: 15 MMOL/L (ref 10–20)
AST SERPL W P-5'-P-CCNC: 16 U/L (ref 9–39)
BASOPHILS # BLD AUTO: 0.04 X10*3/UL (ref 0–0.1)
BASOPHILS NFR BLD AUTO: 0.4 %
BILIRUB SERPL-MCNC: 0.2 MG/DL (ref 0–1.2)
BUN SERPL-MCNC: 13 MG/DL (ref 6–23)
CALCIUM SERPL-MCNC: 9.6 MG/DL (ref 8.6–10.6)
CHLORIDE SERPL-SCNC: 105 MMOL/L (ref 98–107)
CHOLEST SERPL-MCNC: 183 MG/DL (ref 0–199)
CHOLESTEROL/HDL RATIO: 4.5
CO2 SERPL-SCNC: 21 MMOL/L (ref 21–32)
CREAT SERPL-MCNC: 0.64 MG/DL (ref 0.5–1.05)
EGFRCR SERPLBLD CKD-EPI 2021: >90 ML/MIN/1.73M*2
EOSINOPHIL # BLD AUTO: 0.05 X10*3/UL (ref 0–0.7)
EOSINOPHIL NFR BLD AUTO: 0.4 %
ERYTHROCYTE [DISTWIDTH] IN BLOOD BY AUTOMATED COUNT: 13.2 % (ref 11.5–14.5)
GLUCOSE SERPL-MCNC: 88 MG/DL (ref 74–99)
HCT VFR BLD AUTO: 42.2 % (ref 36–46)
HDLC SERPL-MCNC: 40.5 MG/DL
HGB BLD-MCNC: 13.4 G/DL (ref 12–16)
IMM GRANULOCYTES # BLD AUTO: 0.11 X10*3/UL (ref 0–0.7)
IMM GRANULOCYTES NFR BLD AUTO: 1 % (ref 0–0.9)
IRON SATN MFR SERPL: 32 % (ref 25–45)
IRON SERPL-MCNC: 115 UG/DL (ref 35–150)
LDLC SERPL CALC-MCNC: 102 MG/DL
LYMPHOCYTES # BLD AUTO: 2.53 X10*3/UL (ref 1.2–4.8)
LYMPHOCYTES NFR BLD AUTO: 22.4 %
MCH RBC QN AUTO: 27.7 PG (ref 26–34)
MCHC RBC AUTO-ENTMCNC: 31.8 G/DL (ref 32–36)
MCV RBC AUTO: 87 FL (ref 80–100)
MONOCYTES # BLD AUTO: 0.62 X10*3/UL (ref 0.1–1)
MONOCYTES NFR BLD AUTO: 5.5 %
NEUTROPHILS # BLD AUTO: 7.96 X10*3/UL (ref 1.2–7.7)
NEUTROPHILS NFR BLD AUTO: 70.3 %
NON HDL CHOLESTEROL: 143 MG/DL (ref 0–149)
NRBC BLD-RTO: 0 /100 WBCS (ref 0–0)
PLATELET # BLD AUTO: 444 X10*3/UL (ref 150–450)
POTASSIUM SERPL-SCNC: 4.2 MMOL/L (ref 3.5–5.3)
PROT SERPL-MCNC: 7.1 G/DL (ref 6.4–8.2)
RBC # BLD AUTO: 4.83 X10*6/UL (ref 4–5.2)
SODIUM SERPL-SCNC: 137 MMOL/L (ref 136–145)
TIBC SERPL-MCNC: 357 UG/DL (ref 240–445)
TRIGL SERPL-MCNC: 203 MG/DL (ref 0–149)
UIBC SERPL-MCNC: 242 UG/DL (ref 110–370)
VLDL: 41 MG/DL (ref 0–40)
WBC # BLD AUTO: 11.3 X10*3/UL (ref 4.4–11.3)

## 2024-01-22 PROCEDURE — 85025 COMPLETE CBC W/AUTO DIFF WBC: CPT

## 2024-01-22 PROCEDURE — 83540 ASSAY OF IRON: CPT

## 2024-01-22 PROCEDURE — 83550 IRON BINDING TEST: CPT

## 2024-01-22 PROCEDURE — 36415 COLL VENOUS BLD VENIPUNCTURE: CPT

## 2024-01-22 PROCEDURE — 82306 VITAMIN D 25 HYDROXY: CPT

## 2024-01-22 PROCEDURE — 80061 LIPID PANEL: CPT

## 2024-01-22 PROCEDURE — 80053 COMPREHEN METABOLIC PANEL: CPT

## 2024-01-25 DIAGNOSIS — N93.8 DUB (DYSFUNCTIONAL UTERINE BLEEDING): ICD-10-CM

## 2024-01-25 DIAGNOSIS — J45.909 ASTHMA, UNSPECIFIED ASTHMA SEVERITY, UNSPECIFIED WHETHER COMPLICATED, UNSPECIFIED WHETHER PERSISTENT (HHS-HCC): ICD-10-CM

## 2024-01-25 NOTE — TELEPHONE ENCOUNTER
Pt is requesting refills for:      tiotropium (Spiriva) 18 mcg inhalation capsule [[id number]]      Order Details  Dose: 1 capsule Route: inhalation Frequency: Daily RT   Dispense Quantity: 30 capsul         albuterol 2.5 mg /3 mL (0.083 %) nebulizer solution [[id number]]    Order Details  Dose, Route, Frequency: As Directed   Dispense Quantity: 75 mL           norethindrone ac-eth estradioL (Junel 1/20, ,) 1-20 mg-mcg tablet [[id number]]    Order Details  Dose: 1 tablet Route: oral Frequency: Daily   Dispense Quantity: 21 tablet Refills: 6          Sig: Take 1 tablet by mouth once daily. FOR 21 DAYS, THEN 7 TABLET-FREE DAYS; REPEAT.       [id number] - [place], [place] - [address] [facility]  [address] [facility] [address]  Phone: [phone]  Fax: [phone]

## 2024-01-26 ENCOUNTER — OFFICE VISIT (OUTPATIENT)
Dept: PRIMARY CARE | Facility: CLINIC | Age: 39
End: 2024-01-26
Payer: COMMERCIAL

## 2024-01-26 VITALS
SYSTOLIC BLOOD PRESSURE: 104 MMHG | DIASTOLIC BLOOD PRESSURE: 70 MMHG | BODY MASS INDEX: 39.45 KG/M2 | HEART RATE: 102 BPM | OXYGEN SATURATION: 97 % | WEIGHT: 202 LBS

## 2024-01-26 DIAGNOSIS — J30.1 SEASONAL ALLERGIC RHINITIS DUE TO POLLEN: ICD-10-CM

## 2024-01-26 DIAGNOSIS — E55.9 VITAMIN D DEFICIENCY: ICD-10-CM

## 2024-01-26 DIAGNOSIS — J45.909 ASTHMA, UNSPECIFIED ASTHMA SEVERITY, UNSPECIFIED WHETHER COMPLICATED, UNSPECIFIED WHETHER PERSISTENT (HHS-HCC): ICD-10-CM

## 2024-01-26 DIAGNOSIS — K21.9 GASTROESOPHAGEAL REFLUX DISEASE, UNSPECIFIED WHETHER ESOPHAGITIS PRESENT: Primary | ICD-10-CM

## 2024-01-26 PROBLEM — E86.0 DEHYDRATION: Status: ACTIVE | Noted: 2024-01-26

## 2024-01-26 PROBLEM — Z86.2 HISTORY OF ANEMIA: Status: ACTIVE | Noted: 2024-01-26

## 2024-01-26 PROBLEM — R63.4 WEIGHT LOSS: Status: ACTIVE | Noted: 2024-01-26

## 2024-01-26 PROCEDURE — 99214 OFFICE O/P EST MOD 30 MIN: CPT | Performed by: FAMILY MEDICINE

## 2024-01-26 RX ORDER — ALBUTEROL SULFATE 0.83 MG/ML
SOLUTION RESPIRATORY (INHALATION)
Qty: 75 ML | Refills: 3 | Status: SHIPPED | OUTPATIENT
Start: 2024-01-26 | End: 2024-01-26 | Stop reason: SDUPTHER

## 2024-01-26 RX ORDER — ALBUTEROL SULFATE 0.83 MG/ML
SOLUTION RESPIRATORY (INHALATION)
Qty: 75 ML | Refills: 3 | Status: SHIPPED | OUTPATIENT
Start: 2024-01-26

## 2024-01-26 RX ORDER — TIOTROPIUM BROMIDE 18 UG/1
1 CAPSULE ORAL; RESPIRATORY (INHALATION)
Qty: 30 CAPSULE | Refills: 3 | Status: SHIPPED | OUTPATIENT
Start: 2024-01-26 | End: 2024-01-26 | Stop reason: SDUPTHER

## 2024-01-26 RX ORDER — NORETHINDRONE ACETATE AND ETHINYL ESTRADIOL 1.5-30(21)
1 KIT ORAL DAILY
Qty: 84 TABLET | Refills: 1 | Status: SHIPPED | OUTPATIENT
Start: 2024-01-26 | End: 2024-07-12

## 2024-01-26 RX ORDER — TIOTROPIUM BROMIDE 18 UG/1
1 CAPSULE ORAL; RESPIRATORY (INHALATION)
Qty: 30 CAPSULE | Refills: 3 | Status: SHIPPED | OUTPATIENT
Start: 2024-01-26 | End: 2024-02-14 | Stop reason: SDUPTHER

## 2024-01-26 ASSESSMENT — ENCOUNTER SYMPTOMS
CHEST TIGHTNESS: 0
WHEEZING: 0
SHORTNESS OF BREATH: 1
HOARSE VOICE: 1

## 2024-01-26 NOTE — PROGRESS NOTES
Subjective   Patient ID: Keri Lowe is a 38 y.o. female who presents for Asthma.    Asthma  She complains of hoarse voice and shortness of breath. There is no chest tightness or wheezing. This is a chronic problem. The current episode started more than 1 year ago. The problem occurs daily. The problem has been waxing and waning. Pertinent negatives include no chest pain. Her symptoms are aggravated by change in weather, strenuous activity and pollen. Her past medical history is significant for asthma.      Patient would like a pharmacy referral for advair and spiriva due to cost.    Patient would like to discuss if she should get a covid booster due to having asthma.      Patient would like to discuss lab results.    Review of Systems   HENT:  Positive for hoarse voice.    Respiratory:  Positive for shortness of breath. Negative for wheezing.    Cardiovascular:  Negative for chest pain.   12 Systems have been reviewed as follows.  Constitutional: Fever, weight gain, weight loss, appetite change, night sweats, fatigue, chills.  Eyes : blurry, double vision, vision, loss, tearing, redness, pain, sensitivity to light, glaucoma.  Ears, nose, mouth, and throat: Hearing loss, ringing in the ears, ear pain, nasal congestion, nasal drainage, nosebleeds, mouth, throat, irritation tooth problem.  Cardiovascular :chest pain, pressure, heart racing, palpitations, sweating, leg swelling, high or low blood pressure  Pulmonary: Cough, yellow or green sputum, blood and sputum, shortness of breath, wheezing  Gastrointestinal: Nausea, vomiting, diarrhea, constipation, pain, blood in stool, or vomitus, heartburn, difficulty swallowing  Genitourinary: incontinence, abnormal bleeding, abnormal discharge, urinary frequency, urinary hesitancy, pain, impotence sexual problem, infection, urinary retention  Musculoskeletal: Pain, stiffness, joint, redness or warmth, arthritis, back pain, weakness, muscle wasting, sprain or  fracture  Neuro: Weight weakness, dizziness, change in voice, change in taste change in vision, change in hearing, loss, or change of sensation, trouble walking, balance problems coordination problems, shaking, speech problem  Endocrine , cold or heat intolerance, blood sugar problem, weight gain or loss missed periods hot flashes, sweats, change in body hair, change in libido, increased thirst, increased urination  Heme/lymph: Swelling, bleeding, problem anemia, bruising, enlarged lymph nodes  Allergic/immunologic: H. plus nasal drip, watery itchy eyes, nasal drainage, immunosuppressed  The above were reviewed and noted negative except as noted in HPI and Problem List.      Objective   /70 (BP Location: Right arm, Patient Position: Sitting)   Pulse 102   Wt 91.6 kg (202 lb)   SpO2 97%   BMI 39.45 kg/m²     Physical Exam  Constitutional: Well developed, well nourished, alert and in no acute distress   Eyes: Normal external exam. Pupils equally round and reactive to light with normal accommodation and extraocular movements intact.  Neck: Supple, no lymphadenopathy or masses.   Cardiovascular: Regular rate and rhythm, normal S1 and S2, no murmurs, gallops, or rubs. Radial pulses normal. No peripheral edema.  Pulmonary: No respiratory distress, lungs clear to auscultation bilaterally. No wheezes, rhonchi, rales.  Abdomen: soft,non tender, non distended, without masses or HSM  Skin: Warm, well perfused, normal skin turgor and color.   Neurologic: Cranial nerves II-XII grossly intact.   Psychiatric: Mood calm and affect normal  Musculoskeletal: Moving all extremities without restriction    Assessment/Plan   Problem List Items Addressed This Visit             ICD-10-CM    Allergic rhinitis J30.9    Asthma J45.909    Relevant Medications    tiotropium (Spiriva) 18 mcg inhalation capsule    albuterol 2.5 mg /3 mL (0.083 %) nebulizer solution    Other Relevant Orders    Follow Up In [facility] - Pharmacy     Follow Up In [facility] - PCP - Established    GERD (gastroesophageal reflux disease) - Primary K21.9    Vitamin D deficiency E55.9       Cont spiriva     seeing Dr. Lea   for iron def anemia     xanax prn      consider pelvic u/s. not needed at this time due to pelvic symptoms being resolved      using humidifier       get home sleep study in future     Consider adding singular next       Scribe Attestation  By signing my name below, I, Lambert Bell MD   , Scribe   attest that this documentation has been prepared under the direction and in the presence of Lambert Bell MD.    Continue current medications and therapy for chronic medical conditions      Provider Attestation - Scribe documentation    All medical record entries made by the Scribe were at my direction and personally dictated by me. I have reviewed the chart and agree that the record accurately reflects my personal performance of the history, physical exam, discussion and plan.

## 2024-02-14 ENCOUNTER — SPECIALTY PHARMACY (OUTPATIENT)
Dept: PHARMACY | Facility: CLINIC | Age: 39
End: 2024-02-14

## 2024-02-14 ENCOUNTER — TELEMEDICINE (OUTPATIENT)
Dept: PHARMACY | Facility: HOSPITAL | Age: 39
End: 2024-02-14
Payer: COMMERCIAL

## 2024-02-14 DIAGNOSIS — J45.909 ASTHMA, UNSPECIFIED ASTHMA SEVERITY, UNSPECIFIED WHETHER COMPLICATED, UNSPECIFIED WHETHER PERSISTENT (HHS-HCC): ICD-10-CM

## 2024-02-14 RX ORDER — FLUTICASONE PROPIONATE AND SALMETEROL 500; 50 UG/1; UG/1
1 POWDER RESPIRATORY (INHALATION)
Qty: 180 EACH | Refills: 3 | Status: SHIPPED | OUTPATIENT
Start: 2024-02-14 | End: 2024-02-20 | Stop reason: SDUPTHER

## 2024-02-14 RX ORDER — TIOTROPIUM BROMIDE 18 UG/1
1 CAPSULE ORAL; RESPIRATORY (INHALATION)
Qty: 90 CAPSULE | Refills: 3 | Status: SHIPPED | OUTPATIENT
Start: 2024-02-14 | End: 2025-02-08

## 2024-02-14 ASSESSMENT — ENCOUNTER SYMPTOMS
HOARSE VOICE: 0
DIFFICULTY BREATHING: 1

## 2024-02-14 NOTE — PROGRESS NOTES
Clinical Pharmacy Appointment    Subjective   Patient ID: Keri Lowe is a 38 y.o. female who presents for Asthma.    Referring Provider: Lambert Bell MD     Asthma  She complains of difficulty breathing. There is no hoarse voice. This is a chronic problem. The current episode started more than 1 year ago. The problem occurs every several days. The problem has been gradually improving. Her symptoms are aggravated by exercise (Sunlight). Her past medical history is significant for asthma.      Patient Assistance Screening (VAF)  Patient verbally reports monthly or yearly income which is less than 400% federal poverty level  Application for program has been submitted for the following medications:   Spiriva 18mcg 1 inhalation daily  Advair 500/50 mcg/dose 1 inhalation twice a day  Patient has been informed that program team will be reaching out to them to discuss necessary documentation, instructed to answer phone/return voicemail. Patient will email [facility] her tax information.  Patient aware this process may take up to 6 weeks.   If approved medication must be filled through [facility] pharmacy and may be picked up or mailed to patient.   Counseled patient on mechanism of action, side effects, contraindications, and what to do if the patient misses a dose. All patients questions were answered.     Objective     Labs  Lab Results   Component Value Date    BILITOT 0.2 01/22/2024    CALCIUM 9.6 01/22/2024    CO2 21 01/22/2024     01/22/2024    CREATININE 0.64 01/22/2024    GLUCOSE 88 01/22/2024    ALKPHOS 54 01/22/2024    K 4.2 01/22/2024    PROT 7.1 01/22/2024     01/22/2024    AST 16 01/22/2024    ALT 41 01/22/2024    BUN 13 01/22/2024    ANIONGAP 15 01/22/2024    ALBUMIN 4.2 01/22/2024    LIPASE 11 10/08/2019    GFRF >90 08/09/2022     Lab Results   Component Value Date    TRIG 203 (H) 01/22/2024    CHOL 183 01/22/2024    LDLCALC 102 (H) 01/22/2024    HDL 40.5 01/22/2024     No results found for:  "\"HGBA1C\"    Current Outpatient Medications on File Prior to Visit   Medication Sig Dispense Refill    acetaminophen (Tylenol) 500 mg tablet Take 2 tablets (1,000 mg) by mouth. EVERY 4 TO 6 HOURS AS NEEDED.      albuterol 2.5 mg /3 mL (0.083 %) nebulizer solution USE 1 UNIT DOSE EVERY 4-6 HOURS AS NEEDED FOR WHEEZING . 75 mL 3    albuterol 90 mcg/actuation inhaler Inhale 1-2 puffs 3 times a day. EVERY 4 TO 6 HOURS AS NEEDED. 18 g 1    ALPRAZolam (Xanax) 0.25 mg tablet Take 1 tablet (0.25 mg) by mouth 3 times a day as needed.      cholecalciferol (Vitamin D-3) 25 MCG (1000 UT) capsule Take 1 capsule (25 mcg) by mouth once daily. 90 capsule 0    escitalopram (Lexapro) 10 mg tablet Take 1 tablet (10 mg) by mouth once every day. 90 tablet 0    loratadine 10 mg capsule Take 1 capsule by mouth once daily. 90 capsule 1    norethindrone-e.estradioL-iron (Microgestin FE 1.5/30) 1.5 mg-30 mcg (21)/75 mg (7) tablet Take 1 tablet by mouth once daily. 84 tablet 1    pantoprazole (ProtoNix) 40 mg EC tablet Take 1 tablet (40 mg) by mouth once daily. 90 tablet 1    [DISCONTINUED] fluticasone propion-salmeteroL (Advair Diskus) 500-50 mcg/dose diskus inhaler Inhale 1 puff 2 times a day. 60 each 3    [DISCONTINUED] tiotropium (Spiriva) 18 mcg inhalation capsule Place 1 capsule (18 mcg) into inhaler and inhale once daily. 30 capsule 3     No current facility-administered medications on file prior to visit.      Assessment/Plan   Problem List Items Addressed This Visit             ICD-10-CM    Asthma J45.909     CONTINUE current asthma regimen. Patient states with her broken esophogus she feels as though the regimen is gradually working.  Send prescriptions for Advair 500/50 1 puff 2 times a day and Spiriva 18mcg 1 puff daily to [facility] for mail order and to be assessed for  Patient Assistance.  Continue utilizing albuterol neb and rescue inhaler as needed. Patient states her asthma is sunlight induced, so she often has to " go out at night. If she goes out during the day she knows she will need her albuterol.         Relevant Medications    fluticasone propion-salmeteroL (Advair Diskus) 500-50 mcg/dose diskus inhaler    tiotropium (Spiriva) 18 mcg inhalation capsule     Follow up with Clinical Pharmacy Team once there has been a determination for  Patient Assistance.    Continue all meds under the continuation of care with the referring provider and clinical pharmacy team.    Please reach out to the Clinical Pharmacy Team if there are any further questions.     Verbal consent to manage patient's drug therapy was obtained from patient. They were informed they may decline to participate or withdraw from participation in pharmacy services at any time.    Irma Albright, PharmD  [phone]

## 2024-02-19 ENCOUNTER — TELEPHONE (OUTPATIENT)
Dept: PRIMARY CARE | Facility: CLINIC | Age: 39
End: 2024-02-19
Payer: COMMERCIAL

## 2024-02-20 DIAGNOSIS — J45.909 ASTHMA, UNSPECIFIED ASTHMA SEVERITY, UNSPECIFIED WHETHER COMPLICATED, UNSPECIFIED WHETHER PERSISTENT (HHS-HCC): ICD-10-CM

## 2024-02-20 RX ORDER — FLUTICASONE PROPIONATE AND SALMETEROL 500; 50 UG/1; UG/1
1 POWDER RESPIRATORY (INHALATION)
Qty: 180 EACH | Refills: 3 | Status: SHIPPED | OUTPATIENT
Start: 2024-02-20 | End: 2024-03-07 | Stop reason: SDUPTHER

## 2024-02-20 NOTE — TELEPHONE ENCOUNTER
REQUESTS:  fluticasone propion-salmeteroL (Advair Diskus) 500-50 mcg/dose diskus inhaler     SENT TO:  [facility].    **IS WAITING FOR PATIENT ASSISTANCE TO GO THROUGH, BUT NEEDS ONE NOW

## 2024-03-05 PROCEDURE — RXMED WILLOW AMBULATORY MEDICATION CHARGE

## 2024-03-07 ENCOUNTER — TELEPHONE (OUTPATIENT)
Dept: PHARMACY | Facility: HOSPITAL | Age: 39
End: 2024-03-07
Payer: COMMERCIAL

## 2024-03-07 ENCOUNTER — PHARMACY VISIT (OUTPATIENT)
Dept: PHARMACY | Facility: CLINIC | Age: 39
End: 2024-03-07
Payer: COMMERCIAL

## 2024-03-07 DIAGNOSIS — J45.909 ASTHMA, UNSPECIFIED ASTHMA SEVERITY, UNSPECIFIED WHETHER COMPLICATED, UNSPECIFIED WHETHER PERSISTENT (HHS-HCC): ICD-10-CM

## 2024-03-07 RX ORDER — FLUTICASONE PROPIONATE AND SALMETEROL 500; 50 UG/1; UG/1
1 POWDER RESPIRATORY (INHALATION)
Qty: 180 EACH | Refills: 3 | Status: SHIPPED | OUTPATIENT
Start: 2024-03-07

## 2024-03-26 ENCOUNTER — OFFICE VISIT (OUTPATIENT)
Dept: PRIMARY CARE | Facility: CLINIC | Age: 39
End: 2024-03-26
Payer: COMMERCIAL

## 2024-03-26 VITALS
DIASTOLIC BLOOD PRESSURE: 76 MMHG | HEART RATE: 74 BPM | SYSTOLIC BLOOD PRESSURE: 128 MMHG | WEIGHT: 208 LBS | OXYGEN SATURATION: 98 % | BODY MASS INDEX: 40.84 KG/M2 | HEIGHT: 60 IN

## 2024-03-26 DIAGNOSIS — R53.83 FATIGUE, UNSPECIFIED TYPE: ICD-10-CM

## 2024-03-26 DIAGNOSIS — E55.9 VITAMIN D DEFICIENCY: ICD-10-CM

## 2024-03-26 DIAGNOSIS — E78.5 DYSLIPIDEMIA: ICD-10-CM

## 2024-03-26 DIAGNOSIS — D50.8 OTHER IRON DEFICIENCY ANEMIA: ICD-10-CM

## 2024-03-26 DIAGNOSIS — F41.9 ANXIETY: ICD-10-CM

## 2024-03-26 DIAGNOSIS — J45.909 ASTHMA, UNSPECIFIED ASTHMA SEVERITY, UNSPECIFIED WHETHER COMPLICATED, UNSPECIFIED WHETHER PERSISTENT (HHS-HCC): ICD-10-CM

## 2024-03-26 PROBLEM — J30.9 ALLERGIC RHINITIS: Status: RESOLVED | Noted: 2023-02-21 | Resolved: 2024-03-26

## 2024-03-26 PROCEDURE — 99214 OFFICE O/P EST MOD 30 MIN: CPT | Performed by: FAMILY MEDICINE

## 2024-03-26 RX ORDER — ESCITALOPRAM OXALATE 10 MG/1
10 TABLET ORAL
Qty: 90 TABLET | Refills: 0 | Status: SHIPPED | OUTPATIENT
Start: 2024-03-26

## 2024-03-26 NOTE — PROGRESS NOTES
Subjective   Patient ID: Keri Lowe is a 38 y.o. female who presents for Hand Injury.    HPI Pt states  she cut her right hand yesterday and would like it looked at today, she cut it on a belkys tool, she states her tent inus shot is good for another five years.    Pt would like to discuss getting tested for Autism.        Review of Systems  12 Systems have been reviewed as follows.  Constitutional: Fever, weight gain, weight loss, appetite change, night sweats, fatigue, chills.  Eyes : blurry, double vision, vision, loss, tearing, redness, pain, sensitivity to light, glaucoma.  Ears, nose, mouth, and throat: Hearing loss, ringing in the ears, ear pain, nasal congestion, nasal drainage, nosebleeds, mouth, throat, irritation tooth problem.  Cardiovascular :chest pain, pressure, heart racing, palpitations, sweating, leg swelling, high or low blood pressure  Pulmonary: Cough, yellow or green sputum, blood and sputum, shortness of breath, wheezing  Gastrointestinal: Nausea, vomiting, diarrhea, constipation, pain, blood in stool, or vomitus, heartburn, difficulty swallowing  Genitourinary: incontinence, abnormal bleeding, abnormal discharge, urinary frequency, urinary hesitancy, pain, impotence sexual problem, infection, urinary retention  Musculoskeletal: Pain, stiffness, joint, redness or warmth, arthritis, back pain, weakness, muscle wasting, sprain or fracture  Neuro: Weight weakness, dizziness, change in voice, change in taste change in vision, change in hearing, loss, or change of sensation, trouble walking, balance problems coordination problems, shaking, speech problem  Endocrine , cold or heat intolerance, blood sugar problem, weight gain or loss missed periods hot flashes, sweats, change in body hair, change in libido, increased thirst, increased urination  Heme/lymph: Swelling, bleeding, problem anemia, bruising, enlarged lymph nodes  Allergic/immunologic: H. plus nasal drip, watery itchy eyes, nasal  drainage, immunosuppressed  The above were reviewed and noted negative except as noted in HPI and Problem List.    Objective   /76 (BP Location: Left arm, Patient Position: Sitting, BP Cuff Size: Adult)   Pulse 74   Ht 1.524 m (5')   Wt 94.3 kg (208 lb)   SpO2 98%   BMI 40.62 kg/m²     Physical Exam  Constitutional: Well developed, well nourished, alert and in no acute distress   Eyes: Normal external exam. Pupils equally round and reactive to light with normal accommodation and extraocular movements intact.  Neck: Supple, no lymphadenopathy or masses.   Cardiovascular: Regular rate and rhythm, normal S1 and S2, no murmurs, gallops, or rubs. Radial pulses normal. No peripheral edema.  Pulmonary: No respiratory distress, lungs clear to auscultation bilaterally. No wheezes, rhonchi, rales.  Abdomen: soft,non tender, non distended, without masses or HSM  Skin: Warm, well perfused, normal skin turgor and color.   Neurologic: Cranial nerves II-XII grossly intact.   Psychiatric: Mood calm and affect normal  Musculoskeletal: Moving all extremities without restriction    Assessment/Plan   Problem List Items Addressed This Visit             ICD-10-CM    Anxiety F41.9    Relevant Medications    escitalopram (Lexapro) 10 mg tablet    Other Relevant Orders    Follow Up In [facility] - PCP - Established    Asthma J45.909    Relevant Orders    Follow Up In [facility] - PCP - Established    Fatigue R53.83    Iron deficiency anemia D50.9    Relevant Orders    CBC and Auto Differential    Iron and TIBC    Vitamin D deficiency E55.9    Relevant Orders    Vitamin D 25-Hydroxy,Total (for eval of Vitamin D levels)     Other Visit Diagnoses         Codes    Dyslipidemia     E78.5    Relevant Orders    Comprehensive Metabolic Panel    Lipid Panel        seeing Dr. Lea      iron def anemia    xanax prn   using humidifier       get home sleep study in future     Consider adding singular next  Follow up 3  months     Bw next     Consider neurology in future for autism spectrum     Scribe Attestation  By signing my name below, I, Jerry Resendiz MA, Scribe   attest that this documentation has been prepared under the direction and in the presence of Lambert Bell MD.    Provider Attestation - Scribe documentation    All medical record entries made by the Scribe were at my direction and personally dictated by me. I have reviewed the chart and agree that the record accurately reflects my personal performance of the history, physical exam, discussion and plan.    Continue current medications and therapy for chronic medical conditions

## 2024-03-29 PROCEDURE — RXMED WILLOW AMBULATORY MEDICATION CHARGE

## 2024-04-02 ENCOUNTER — PHARMACY VISIT (OUTPATIENT)
Dept: PHARMACY | Facility: CLINIC | Age: 39
End: 2024-04-02
Payer: COMMERCIAL

## 2024-04-26 ENCOUNTER — APPOINTMENT (OUTPATIENT)
Dept: PRIMARY CARE | Facility: CLINIC | Age: 39
End: 2024-04-26
Payer: COMMERCIAL

## 2024-06-14 PROCEDURE — RXMED WILLOW AMBULATORY MEDICATION CHARGE

## 2024-06-18 ENCOUNTER — PHARMACY VISIT (OUTPATIENT)
Dept: PHARMACY | Facility: CLINIC | Age: 39
End: 2024-06-18
Payer: COMMERCIAL

## 2024-06-20 ENCOUNTER — TELEPHONE (OUTPATIENT)
Dept: PRIMARY CARE | Facility: CLINIC | Age: 39
End: 2024-06-20
Payer: COMMERCIAL

## 2024-06-20 DIAGNOSIS — R35.0 INCREASED FREQUENCY OF URINATION: ICD-10-CM

## 2024-06-20 DIAGNOSIS — R20.8 BURNING SENSATION: ICD-10-CM

## 2024-06-20 NOTE — TELEPHONE ENCOUNTER
Dr. Bell Pt    Pt calling to let CB know that she thinks she has a UTI  or something going on. Wants to know if CB can call in an antibiotic for her. Please advise, thank you.    Symptoms have been going on for a few days  burning  itching  unrinating frequently  doent feel like emptying bladder      [id number] - [place], [place] - [address]      Pt would like a call either way  [phone]  Nilda

## 2024-06-21 RX ORDER — NITROFURANTOIN 25; 75 MG/1; MG/1
100 CAPSULE ORAL 2 TIMES DAILY
Qty: 20 CAPSULE | Refills: 0 | Status: SHIPPED | OUTPATIENT
Start: 2024-06-21 | End: 2024-07-01

## 2024-06-26 ENCOUNTER — APPOINTMENT (OUTPATIENT)
Dept: PRIMARY CARE | Facility: CLINIC | Age: 39
End: 2024-06-26
Payer: COMMERCIAL

## 2024-06-26 DIAGNOSIS — K21.9 GASTROESOPHAGEAL REFLUX DISEASE, UNSPECIFIED WHETHER ESOPHAGITIS PRESENT: ICD-10-CM

## 2024-06-26 DIAGNOSIS — G47.33 OBSTRUCTIVE SLEEP APNEA, ADULT: ICD-10-CM

## 2024-06-26 DIAGNOSIS — K90.9 INTESTINAL MALABSORPTION, UNSPECIFIED (HHS-HCC): ICD-10-CM

## 2024-06-26 DIAGNOSIS — D50.0 IRON DEFICIENCY ANEMIA SECONDARY TO BLOOD LOSS (CHRONIC): Primary | ICD-10-CM

## 2024-06-26 DIAGNOSIS — E66.01 OBESITY, CLASS III, BMI 40-49.9 (MORBID OBESITY) (MULTI): Primary | ICD-10-CM

## 2024-06-26 DIAGNOSIS — F41.9 ANXIETY: ICD-10-CM

## 2024-06-26 DIAGNOSIS — J45.909 ASTHMA, UNSPECIFIED ASTHMA SEVERITY, UNSPECIFIED WHETHER COMPLICATED, UNSPECIFIED WHETHER PERSISTENT (HHS-HCC): ICD-10-CM

## 2024-06-26 PROCEDURE — 99214 OFFICE O/P EST MOD 30 MIN: CPT | Performed by: FAMILY MEDICINE

## 2024-06-26 RX ORDER — PANTOPRAZOLE SODIUM 40 MG/1
40 TABLET, DELAYED RELEASE ORAL DAILY
Qty: 90 TABLET | Refills: 1 | Status: SHIPPED | OUTPATIENT
Start: 2024-06-26

## 2024-06-26 ASSESSMENT — PATIENT HEALTH QUESTIONNAIRE - PHQ9
2. FEELING DOWN, DEPRESSED OR HOPELESS: NOT AT ALL
SUM OF ALL RESPONSES TO PHQ9 QUESTIONS 1 AND 2: 0
1. LITTLE INTEREST OR PLEASURE IN DOING THINGS: NOT AT ALL

## 2024-06-26 ASSESSMENT — ENCOUNTER SYMPTOMS
NERVOUS/ANXIOUS: 1
PANIC: 1

## 2024-06-26 NOTE — PROGRESS NOTES
Subjective   Patient ID: Keri Lowe is a 38 y.o. female who presents for Anxiety.    Anxiety  Presents for follow-up visit. Symptoms include nervous/anxious behavior and panic. Symptoms occur most days.            Review of Systems   Psychiatric/Behavioral:  The patient is nervous/anxious.    12 Systems have been reviewed as follows.  Constitutional: Fever, weight gain, weight loss, appetite change, night sweats, fatigue, chills.  Eyes : blurry, double vision, vision, loss, tearing, redness, pain, sensitivity to light, glaucoma.  Ears, nose, mouth, and throat: Hearing loss, ringing in the ears, ear pain, nasal congestion, nasal drainage, nosebleeds, mouth, throat, irritation tooth problem.  Cardiovascular :chest pain, pressure, heart racing, palpitations, sweating, leg swelling, high or low blood pressure  Pulmonary: Cough, yellow or green sputum, blood and sputum, shortness of breath, wheezing  Gastrointestinal: Nausea, vomiting, diarrhea, constipation, pain, blood in stool, or vomitus, heartburn, difficulty swallowing  Genitourinary: incontinence, abnormal bleeding, abnormal discharge, urinary frequency, urinary hesitancy, pain, impotence sexual problem, infection, urinary retention  Musculoskeletal: Pain, stiffness, joint, redness or warmth, arthritis, back pain, weakness, muscle wasting, sprain or fracture  Neuro: Weight weakness, dizziness, change in voice, change in taste change in vision, change in hearing, loss, or change of sensation, trouble walking, balance problems coordination problems, shaking, speech problem  Endocrine , cold or heat intolerance, blood sugar problem, weight gain or loss missed periods hot flashes, sweats, change in body hair, change in libido, increased thirst, increased urination  Heme/lymph: Swelling, bleeding, problem anemia, bruising, enlarged lymph nodes  Allergic/immunologic: H. plus nasal drip, watery itchy eyes, nasal drainage, immunosuppressed  The above were reviewed and  noted negative except as noted in HPI and Problem List.      Objective   There were no vitals taken for this visit.    Physical Exam  Constitutional: Well developed, well nourished, alert and in no acute distress     Assessment/Plan   Problem List Items Addressed This Visit             ICD-10-CM    Anxiety F41.9    Relevant Orders    Follow Up In [facility] - PCP - Established    Asthma ([facility]) J45.909    Relevant Orders    Follow Up In [facility] - PCP - Established    GERD (gastroesophageal reflux disease) K21.9    Relevant Medications    pantoprazole (ProtoNix) 40 mg EC tablet    Other Relevant Orders    Follow Up In [facility] - PCP - Established    Obstructive sleep apnea, adult G47.33    Relevant Orders    Follow Up In [facility] - PCP - Established    Obesity, Class III, BMI 40-49.9 (morbid obesity) (Multi) - Primary E66.01    Relevant Orders    Follow Up In [facility] - PCP - Established        seeing Dr. Lea      iron def anemia     xanax prn   using humidifier       get home sleep study in future     Consider adding singular next  Follow up 3 months      Bw next      Consider neurology in future for autism spectrum    Virtual Visit - Audio and Visual Communication Real Time

## 2024-06-27 ENCOUNTER — LAB (OUTPATIENT)
Dept: LAB | Facility: LAB | Age: 39
End: 2024-06-27
Payer: COMMERCIAL

## 2024-06-27 DIAGNOSIS — D50.8 OTHER IRON DEFICIENCY ANEMIA: ICD-10-CM

## 2024-06-27 DIAGNOSIS — D50.0 IRON DEFICIENCY ANEMIA SECONDARY TO BLOOD LOSS (CHRONIC): ICD-10-CM

## 2024-06-27 DIAGNOSIS — E78.5 DYSLIPIDEMIA: ICD-10-CM

## 2024-06-27 DIAGNOSIS — K90.9 INTESTINAL MALABSORPTION, UNSPECIFIED (HHS-HCC): ICD-10-CM

## 2024-06-27 DIAGNOSIS — E55.9 VITAMIN D DEFICIENCY: ICD-10-CM

## 2024-06-27 LAB
25(OH)D3 SERPL-MCNC: 23 NG/ML (ref 30–100)
ALBUMIN SERPL BCP-MCNC: 4.4 G/DL (ref 3.4–5)
ALP SERPL-CCNC: 68 U/L (ref 33–110)
ALT SERPL W P-5'-P-CCNC: 49 U/L (ref 7–45)
ANION GAP SERPL CALC-SCNC: 16 MMOL/L (ref 10–20)
AST SERPL W P-5'-P-CCNC: 24 U/L (ref 9–39)
BASOPHILS # BLD AUTO: 0.06 X10*3/UL (ref 0–0.1)
BASOPHILS NFR BLD AUTO: 0.5 %
BILIRUB SERPL-MCNC: 0.3 MG/DL (ref 0–1.2)
BUN SERPL-MCNC: 12 MG/DL (ref 6–23)
CALCIUM SERPL-MCNC: 9.7 MG/DL (ref 8.6–10.6)
CHLORIDE SERPL-SCNC: 102 MMOL/L (ref 98–107)
CHOLEST SERPL-MCNC: 219 MG/DL (ref 0–199)
CHOLESTEROL/HDL RATIO: 5.2
CO2 SERPL-SCNC: 22 MMOL/L (ref 21–32)
CREAT SERPL-MCNC: 0.68 MG/DL (ref 0.5–1.05)
EGFRCR SERPLBLD CKD-EPI 2021: >90 ML/MIN/1.73M*2
EOSINOPHIL # BLD AUTO: 0.17 X10*3/UL (ref 0–0.7)
EOSINOPHIL NFR BLD AUTO: 1.4 %
ERYTHROCYTE [DISTWIDTH] IN BLOOD BY AUTOMATED COUNT: 12.7 % (ref 11.5–14.5)
FERRITIN SERPL-MCNC: 491 NG/ML (ref 8–150)
GLUCOSE SERPL-MCNC: 97 MG/DL (ref 74–99)
HCT VFR BLD AUTO: 43.3 % (ref 36–46)
HDLC SERPL-MCNC: 42.4 MG/DL
HGB BLD-MCNC: 13.5 G/DL (ref 12–16)
IMM GRANULOCYTES # BLD AUTO: 0.08 X10*3/UL (ref 0–0.7)
IMM GRANULOCYTES NFR BLD AUTO: 0.7 % (ref 0–0.9)
IRON SATN MFR SERPL: 20 % (ref 25–45)
IRON SERPL-MCNC: 74 UG/DL (ref 35–150)
LDLC SERPL CALC-MCNC: 126 MG/DL
LYMPHOCYTES # BLD AUTO: 2.65 X10*3/UL (ref 1.2–4.8)
LYMPHOCYTES NFR BLD AUTO: 22.5 %
MCH RBC QN AUTO: 28.8 PG (ref 26–34)
MCHC RBC AUTO-ENTMCNC: 31.2 G/DL (ref 32–36)
MCV RBC AUTO: 92 FL (ref 80–100)
MONOCYTES # BLD AUTO: 0.75 X10*3/UL (ref 0.1–1)
MONOCYTES NFR BLD AUTO: 6.4 %
NEUTROPHILS # BLD AUTO: 8.05 X10*3/UL (ref 1.2–7.7)
NEUTROPHILS NFR BLD AUTO: 68.5 %
NON HDL CHOLESTEROL: 177 MG/DL (ref 0–149)
NRBC BLD-RTO: 0 /100 WBCS (ref 0–0)
PLATELET # BLD AUTO: 404 X10*3/UL (ref 150–450)
POTASSIUM SERPL-SCNC: 4.2 MMOL/L (ref 3.5–5.3)
PROT SERPL-MCNC: 7.4 G/DL (ref 6.4–8.2)
RBC # BLD AUTO: 4.69 X10*6/UL (ref 4–5.2)
SODIUM SERPL-SCNC: 136 MMOL/L (ref 136–145)
TIBC SERPL-MCNC: 373 UG/DL (ref 240–445)
TRIGL SERPL-MCNC: 251 MG/DL (ref 0–149)
UIBC SERPL-MCNC: 299 UG/DL (ref 110–370)
VLDL: 50 MG/DL (ref 0–40)
WBC # BLD AUTO: 11.8 X10*3/UL (ref 4.4–11.3)

## 2024-06-27 PROCEDURE — 80053 COMPREHEN METABOLIC PANEL: CPT

## 2024-06-27 PROCEDURE — 36415 COLL VENOUS BLD VENIPUNCTURE: CPT

## 2024-06-27 PROCEDURE — 83540 ASSAY OF IRON: CPT

## 2024-06-27 PROCEDURE — 82306 VITAMIN D 25 HYDROXY: CPT

## 2024-06-27 PROCEDURE — 80061 LIPID PANEL: CPT

## 2024-06-27 PROCEDURE — 85025 COMPLETE CBC W/AUTO DIFF WBC: CPT

## 2024-06-27 PROCEDURE — 82728 ASSAY OF FERRITIN: CPT

## 2024-06-27 PROCEDURE — 83550 IRON BINDING TEST: CPT

## 2024-07-24 PROCEDURE — RXMED WILLOW AMBULATORY MEDICATION CHARGE

## 2024-07-26 ENCOUNTER — PHARMACY VISIT (OUTPATIENT)
Dept: PHARMACY | Facility: CLINIC | Age: 39
End: 2024-07-26
Payer: COMMERCIAL

## 2024-08-08 ENCOUNTER — TELEPHONE (OUTPATIENT)
Dept: PRIMARY CARE | Facility: CLINIC | Age: 39
End: 2024-08-08
Payer: COMMERCIAL

## 2024-08-08 NOTE — TELEPHONE ENCOUNTER
Dr. Bell patient    Patient called in regards to a piece of her nebulizer machine tubing that broke off. I did advise patient to contact local [facility] to see if they have a replacement since [place] office is closed. I did advise patient that we will be returning to the office next Thursday if she is not able to get replacement part from them Patient understood and is going to contact [facility],

## 2024-08-27 DIAGNOSIS — F41.9 ANXIETY: Primary | ICD-10-CM

## 2024-08-28 ENCOUNTER — TELEMEDICINE (OUTPATIENT)
Dept: PRIMARY CARE | Facility: CLINIC | Age: 39
End: 2024-08-28
Payer: COMMERCIAL

## 2024-08-28 DIAGNOSIS — J45.909 ASTHMA, UNSPECIFIED ASTHMA SEVERITY, UNSPECIFIED WHETHER COMPLICATED, UNSPECIFIED WHETHER PERSISTENT (HHS-HCC): ICD-10-CM

## 2024-08-28 DIAGNOSIS — G47.33 OBSTRUCTIVE SLEEP APNEA, ADULT: ICD-10-CM

## 2024-08-28 DIAGNOSIS — E66.01 OBESITY, CLASS III, BMI 40-49.9 (MORBID OBESITY) (MULTI): Primary | ICD-10-CM

## 2024-08-28 DIAGNOSIS — E55.9 VITAMIN D DEFICIENCY: ICD-10-CM

## 2024-08-28 DIAGNOSIS — E86.0 DEHYDRATION: ICD-10-CM

## 2024-08-28 DIAGNOSIS — K21.9 GASTROESOPHAGEAL REFLUX DISEASE, UNSPECIFIED WHETHER ESOPHAGITIS PRESENT: ICD-10-CM

## 2024-08-28 PROCEDURE — 99214 OFFICE O/P EST MOD 30 MIN: CPT | Performed by: FAMILY MEDICINE

## 2024-08-28 NOTE — PROGRESS NOTES
Subjective   Patient ID: Nilda Lowe is a 38 y.o. adult who presents for GERD.    HPI   Patient is having increased stomach pain, burning sensation in chest and esophagus since Sunday, 8/25/24 after eating salsa on Saturday 8/24/24. She is unable to eat anything but some chips and water. She feels the food increasing the burning in her stomach.     Patient did retake her protonix and started some tums use.     She also has a few slices of bread that did help reduce the symptoms. She denies diarrhea or constipation.     Review of Systems  12 Systems have been reviewed as follows.  Constitutional: Fever, weight gain, weight loss, appetite change, night sweats, fatigue, chills.  Eyes : blurry, double vision, vision, loss, tearing, redness, pain, sensitivity to light, glaucoma.  Ears, nose, mouth, and throat: Hearing loss, ringing in the ears, ear pain, nasal congestion, nasal drainage, nosebleeds, mouth, throat, irritation tooth problem.  Cardiovascular :chest pain, pressure, heart racing, palpitations, sweating, leg swelling, high or low blood pressure  Pulmonary: Cough, yellow or green sputum, blood and sputum, shortness of breath, wheezing  Gastrointestinal: Nausea, vomiting, diarrhea, constipation, pain, blood in stool, or vomitus, heartburn, difficulty swallowing  Genitourinary: incontinence, abnormal bleeding, abnormal discharge, urinary frequency, urinary hesitancy, pain, impotence sexual problem, infection, urinary retention  Musculoskeletal: Pain, stiffness, joint, redness or warmth, arthritis, back pain, weakness, muscle wasting, sprain or fracture  Neuro: Weight weakness, dizziness, change in voice, change in taste change in vision, change in hearing, loss, or change of sensation, trouble walking, balance problems coordination problems, shaking, speech problem  Endocrine , cold or heat intolerance, blood sugar problem, weight gain or loss missed periods hot flashes, sweats, change in body hair, change in  libido, increased thirst, increased urination  Heme/lymph: Swelling, bleeding, problem anemia, bruising, enlarged lymph nodes  Allergic/immunologic: H. plus nasal drip, watery itchy eyes, nasal drainage, immunosuppressed  The above were reviewed and noted negative except as noted in HPI and Problem List.      Objective   There were no vitals taken for this visit.    Physical Exam  Constitutional: Well developed, well nourished, alert and in no acute distress     Assessment/Plan   Problem List Items Addressed This Visit             ICD-10-CM    Asthma ([facility]) J45.909    Relevant Orders    Follow Up In [facility] - PCP - Established    GERD (gastroesophageal reflux disease) K21.9    Relevant Medications    pantoprazole (ProtoNix) 40 mg EC tablet    sucralfate (Carafate) 1 gram tablet    Other Relevant Orders    Follow Up In [facility] - PCP - Established    Obstructive sleep apnea, adult G47.33    Relevant Orders    Follow Up In [facility] - PCP - Established    Vitamin D deficiency E55.9    Relevant Orders    Follow Up In [facility] - PCP - Established    Obesity, Class III, BMI 40-49.9 (morbid obesity) (Multi) - Primary E66.01    Relevant Orders    Follow Up In [facility] - PCP - Established    Dehydration E86.0    Relevant Orders    Follow Up In [facility] - PCP - Established     Also take lexapro    Consider U/S GB next     seeing Dr. Lea      iron def anemia     xanax prn   using humidifier       get home sleep study in future     Consider adding singular next  Follow up 3 months      Bw next      Consider neurology in future for autism spectrum     Virtual Visit - Audio and Visual Communication Real Time     Continue current medications and therapy for chronic medical conditions

## 2024-08-29 RX ORDER — PANTOPRAZOLE SODIUM 40 MG/1
40 TABLET, DELAYED RELEASE ORAL DAILY
Qty: 180 TABLET | Refills: 1 | Status: SHIPPED | OUTPATIENT
Start: 2024-08-29

## 2024-08-29 RX ORDER — SUCRALFATE 1 G/1
1 TABLET ORAL
Qty: 120 TABLET | Refills: 1 | Status: SHIPPED | OUTPATIENT
Start: 2024-08-29 | End: 2025-08-29

## 2024-08-30 RX ORDER — ALPRAZOLAM 0.25 MG/1
0.25 TABLET ORAL 3 TIMES DAILY PRN
Qty: 30 TABLET | Refills: 0 | Status: SHIPPED | OUTPATIENT
Start: 2024-08-30 | End: 2025-04-27

## 2024-08-30 NOTE — TELEPHONE ENCOUNTER
[facility]pharmacy #[id number] - [place], [place] - [address] [facility]   Pt has VV 8/28, called for refill on 8/27.  Pt needs refill on   ALPRAZolam (Xanax) 0.25 mg tablet   Other medications were sent yesterday.

## 2024-09-03 ENCOUNTER — TELEPHONE (OUTPATIENT)
Dept: PRIMARY CARE | Facility: CLINIC | Age: 39
End: 2024-09-03
Payer: COMMERCIAL

## 2024-09-03 DIAGNOSIS — F41.9 ANXIETY: ICD-10-CM

## 2024-09-03 RX ORDER — ESCITALOPRAM OXALATE 20 MG/1
10 TABLET ORAL EVERY MORNING
Qty: 30 TABLET | Refills: 1 | Status: SHIPPED | OUTPATIENT
Start: 2024-09-03

## 2024-09-03 NOTE — TELEPHONE ENCOUNTER
Pt states that she and  previously discussed increasing her Lexapro dose and she is ready to move forward with that.     Pleases send to:  Pharmacy    [id number] - [place], [place] - [address] AT [place] [address] [address], [facility] [address]  Phone: [phone]  Fax: [phone]

## 2024-09-13 PROCEDURE — RXMED WILLOW AMBULATORY MEDICATION CHARGE

## 2024-09-17 ENCOUNTER — APPOINTMENT (OUTPATIENT)
Dept: PRIMARY CARE | Facility: CLINIC | Age: 39
End: 2024-09-17
Payer: COMMERCIAL

## 2024-09-17 ENCOUNTER — PHARMACY VISIT (OUTPATIENT)
Dept: PHARMACY | Facility: CLINIC | Age: 39
End: 2024-09-17
Payer: COMMERCIAL

## 2024-09-17 DIAGNOSIS — F41.9 ANXIETY: ICD-10-CM

## 2024-09-17 DIAGNOSIS — E55.9 VITAMIN D DEFICIENCY: ICD-10-CM

## 2024-09-17 DIAGNOSIS — D50.8 OTHER IRON DEFICIENCY ANEMIA: ICD-10-CM

## 2024-09-17 DIAGNOSIS — G47.33 OBSTRUCTIVE SLEEP APNEA, ADULT: ICD-10-CM

## 2024-09-17 DIAGNOSIS — E66.01 OBESITY, CLASS III, BMI 40-49.9 (MORBID OBESITY) (MULTI): Primary | ICD-10-CM

## 2024-09-17 DIAGNOSIS — J45.909 ASTHMA, UNSPECIFIED ASTHMA SEVERITY, UNSPECIFIED WHETHER COMPLICATED, UNSPECIFIED WHETHER PERSISTENT (HHS-HCC): ICD-10-CM

## 2024-09-17 DIAGNOSIS — K21.9 GASTROESOPHAGEAL REFLUX DISEASE, UNSPECIFIED WHETHER ESOPHAGITIS PRESENT: ICD-10-CM

## 2024-09-17 PROCEDURE — 99214 OFFICE O/P EST MOD 30 MIN: CPT | Performed by: FAMILY MEDICINE

## 2024-09-17 ASSESSMENT — ENCOUNTER SYMPTOMS: BELCHING: 1

## 2024-09-18 ENCOUNTER — TELEPHONE (OUTPATIENT)
Dept: PRIMARY CARE | Facility: CLINIC | Age: 39
End: 2024-09-18

## 2024-09-18 RX ORDER — ESCITALOPRAM OXALATE 10 MG/1
10 TABLET ORAL EVERY MORNING
Qty: 30 TABLET | Refills: 2 | Status: SHIPPED | OUTPATIENT
Start: 2024-09-18

## 2024-09-18 NOTE — TELEPHONE ENCOUNTER
PT OF BRETT RODRIGUEZ IS CALLING IN B/C SHE HAS NOT RECEIVED HER VV FROM YESTERDAY.     PATIENT WILL STILL LIKE A PHONE CALL      PT IS ALSO REQUESTING A MED REFILL    ESCITALOPRAM 10 MG     [facility]

## 2024-09-20 RX ORDER — ALPRAZOLAM 0.25 MG/1
0.25 TABLET ORAL 3 TIMES DAILY PRN
Qty: 30 TABLET | Refills: 0 | Status: SHIPPED | OUTPATIENT
Start: 2024-09-20 | End: 2025-05-18

## 2024-09-26 DIAGNOSIS — J45.909 ASTHMA, UNSPECIFIED ASTHMA SEVERITY, UNSPECIFIED WHETHER COMPLICATED, UNSPECIFIED WHETHER PERSISTENT (HHS-HCC): ICD-10-CM

## 2024-09-26 DIAGNOSIS — N93.8 DUB (DYSFUNCTIONAL UTERINE BLEEDING): ICD-10-CM

## 2024-09-26 RX ORDER — ALBUTEROL SULFATE 90 UG/1
1-2 INHALANT RESPIRATORY (INHALATION)
Qty: 18 G | Refills: 1 | Status: SHIPPED | OUTPATIENT
Start: 2024-09-26

## 2024-09-26 RX ORDER — NORETHINDRONE ACETATE AND ETHINYL ESTRADIOL 1.5-30(21)
1 KIT ORAL DAILY
Qty: 84 TABLET | Refills: 1 | Status: SHIPPED | OUTPATIENT
Start: 2024-09-26 | End: 2025-03-13

## 2024-09-26 NOTE — TELEPHONE ENCOUNTER
Pt requesting refills     norethindrone-e.estradioL-iron (Microgestin FE 1.5/30) 1.5 mg-30 mcg (21)/75 mg (7) tablet   albuterol 90 mcg/actuation inhaler      Pharmacy    [id number] - [place], [place] - [address]  [address], [facility] [address]  Phone: [phone]  Fax: [phone]

## 2024-10-22 DIAGNOSIS — K21.9 GASTROESOPHAGEAL REFLUX DISEASE, UNSPECIFIED WHETHER ESOPHAGITIS PRESENT: ICD-10-CM

## 2024-10-23 RX ORDER — SUCRALFATE 1 G/1
1 TABLET ORAL
Qty: 120 TABLET | Refills: 1 | Status: SHIPPED | OUTPATIENT
Start: 2024-10-23 | End: 2025-10-23

## 2024-10-28 PROCEDURE — RXMED WILLOW AMBULATORY MEDICATION CHARGE

## 2024-10-31 ENCOUNTER — PHARMACY VISIT (OUTPATIENT)
Dept: PHARMACY | Facility: CLINIC | Age: 39
End: 2024-10-31
Payer: COMMERCIAL

## 2024-11-07 DIAGNOSIS — K21.9 GASTROESOPHAGEAL REFLUX DISEASE, UNSPECIFIED WHETHER ESOPHAGITIS PRESENT: ICD-10-CM

## 2024-11-07 NOTE — TELEPHONE ENCOUNTER
DR WEST PT    PT PHONED OFFICE AND IS REQUESTING REFILL ON     SUCRALFATE 1 GM      [facility] [address]

## 2024-11-08 RX ORDER — SUCRALFATE 1 G/1
1 TABLET ORAL
Qty: 120 TABLET | Refills: 1 | Status: SHIPPED | OUTPATIENT
Start: 2024-11-08 | End: 2025-11-08

## 2024-11-11 ENCOUNTER — APPOINTMENT (OUTPATIENT)
Dept: PRIMARY CARE | Facility: CLINIC | Age: 39
End: 2024-11-11
Payer: COMMERCIAL

## 2024-11-11 DIAGNOSIS — E66.01 OBESITY, CLASS III, BMI 40-49.9 (MORBID OBESITY) (MULTI): ICD-10-CM

## 2024-11-11 DIAGNOSIS — E28.319 PREMATURE MENOPAUSE: ICD-10-CM

## 2024-11-11 DIAGNOSIS — J45.909 ASTHMA, UNSPECIFIED ASTHMA SEVERITY, UNSPECIFIED WHETHER COMPLICATED, UNSPECIFIED WHETHER PERSISTENT (HHS-HCC): ICD-10-CM

## 2024-11-11 DIAGNOSIS — R53.83 FATIGUE, UNSPECIFIED TYPE: Primary | ICD-10-CM

## 2024-11-11 DIAGNOSIS — N93.8 DUB (DYSFUNCTIONAL UTERINE BLEEDING): ICD-10-CM

## 2024-11-11 DIAGNOSIS — K21.9 GASTROESOPHAGEAL REFLUX DISEASE, UNSPECIFIED WHETHER ESOPHAGITIS PRESENT: ICD-10-CM

## 2024-11-11 DIAGNOSIS — E55.9 VITAMIN D DEFICIENCY: ICD-10-CM

## 2024-11-11 PROCEDURE — 99442 PR PHYS/QHP TELEPHONE EVALUATION 11-20 MIN: CPT | Performed by: FAMILY MEDICINE

## 2024-11-11 ASSESSMENT — ENCOUNTER SYMPTOMS
SINUS PRESSURE: 0
CHEST TIGHTNESS: 0
SORE THROAT: 0
SEIZURES: 0
PALPITATIONS: 0
ADENOPATHY: 0
POLYPHAGIA: 0
SINUS PAIN: 0
ABDOMINAL PAIN: 0
BLOOD IN STOOL: 0
RHINORRHEA: 0
DECREASED CONCENTRATION: 0
AGITATION: 0
CONSTITUTIONAL NEGATIVE: 1
ABDOMINAL DISTENTION: 0
STRIDOR: 0
PHOTOPHOBIA: 0
DIARRHEA: 0
DIZZINESS: 0
FATIGUE: 0
HEADACHES: 0
ACTIVITY CHANGE: 0
TROUBLE SWALLOWING: 0
CONFUSION: 0
NECK STIFFNESS: 0
SLEEP DISTURBANCE: 0
SPEECH DIFFICULTY: 0
HEMATURIA: 0
COLOR CHANGE: 0
CONSTIPATION: 0
MYALGIAS: 0
DYSURIA: 0
COUGH: 0
FEVER: 0
FLANK PAIN: 0
NERVOUS/ANXIOUS: 0
APPETITE CHANGE: 0
POLYDIPSIA: 0
SHORTNESS OF BREATH: 0
DYSPHORIC MOOD: 0
RECTAL PAIN: 0
ARTHRALGIAS: 0
EYE PAIN: 0

## 2024-11-11 NOTE — PROGRESS NOTES
Subjective   Patient ID: Nilda Lowe is a 39 y.o. adult who presents for GERD and PRE MENOPAUSE.    HPI Patient is following up on GERD and use of the sucralfate. She is on her 2nd script of the medication and is seeing some improvement.     Patient is concerned she is going through Pre-menopause due to reduced menses duration and flow. She would like blood tests to confirm hormone levels and a referral to Gynecology.     Review of Systems   Constitutional: Negative.  Negative for activity change, appetite change, fatigue and fever.   HENT:  Negative for congestion, dental problem, ear discharge, ear pain, mouth sores, rhinorrhea, sinus pressure, sinus pain, sore throat, tinnitus and trouble swallowing.    Eyes:  Negative for photophobia, pain and visual disturbance.   Respiratory:  Negative for cough, chest tightness, shortness of breath and stridor.    Cardiovascular:  Negative for chest pain and palpitations.   Gastrointestinal:  Negative for abdominal distention, abdominal pain, blood in stool, constipation, diarrhea and rectal pain.   Endocrine: Negative for cold intolerance, heat intolerance, polydipsia, polyphagia and polyuria.   Genitourinary:  Negative for dysuria, flank pain, hematuria and urgency.   Musculoskeletal:  Negative for arthralgias, gait problem, myalgias and neck stiffness.   Skin:  Negative for color change and rash.   Allergic/Immunologic: Negative for environmental allergies and food allergies.   Neurological:  Negative for dizziness, seizures, syncope, speech difficulty and headaches.   Hematological:  Negative for adenopathy.   Psychiatric/Behavioral:  Negative for agitation, confusion, decreased concentration, dysphoric mood and sleep disturbance. The patient is not nervous/anxious.          Objective   There were no vitals taken for this visit.    Physical Exam  Constitutional: Well developed, well nourished, alert and in no acute distress     Assessment/Plan   Problem List Items  Addressed This Visit             ICD-10-CM    Asthma J45.909    Relevant Orders    Follow Up In [facility] - PCP - Established    DUB (dysfunctional uterine bleeding) N93.8    Relevant Orders    Progesterone    Estrogens, Total    FSH & LH    Follow Up In [facility] - PCP - Established    Fatigue - Primary R53.83    Relevant Orders    Progesterone    Estrogens, Total    FSH & LH    Follow Up In [facility] - PCP - Established    GERD (gastroesophageal reflux disease) K21.9    Relevant Orders    Follow Up In [facility] - PCP - Established    Vitamin D deficiency E55.9    Relevant Orders    Follow Up In [facility] - PCP - Established    Obesity, Class III, BMI 40-49.9 (morbid obesity) (Multi) E66.01    Relevant Orders    Follow Up In [facility] - PCP - Established      Carafate helping    Also take lexapro     Consider U/S GB next     seeing Dr. Lea      iron def anemia     xanax prn   using humidifier       get home sleep study in future     Consider adding singular next     Follow up 1 month     Bw next      Consider neurology in future for autism spectrum     Virtual Visit - Audio and Visual Communication Real Time      Continue current medications and therapy for chronic medical conditions

## 2024-11-15 ENCOUNTER — TELEPHONE (OUTPATIENT)
Dept: PRIMARY CARE | Facility: CLINIC | Age: 39
End: 2024-11-15

## 2024-11-15 NOTE — TELEPHONE ENCOUNTER
Dr. Bell patient    Patient says she had a VV scheduled for 11/11 but did not get a call from him.    Please advise, thank you.

## 2024-11-25 ENCOUNTER — LAB (OUTPATIENT)
Dept: LAB | Facility: LAB | Age: 39
End: 2024-11-25
Payer: COMMERCIAL

## 2024-11-25 DIAGNOSIS — K90.9 INTESTINAL MALABSORPTION, UNSPECIFIED: ICD-10-CM

## 2024-11-25 DIAGNOSIS — D50.9 IRON DEFICIENCY ANEMIA, UNSPECIFIED: Primary | ICD-10-CM

## 2024-11-27 ENCOUNTER — LAB (OUTPATIENT)
Dept: LAB | Facility: LAB | Age: 39
End: 2024-11-27
Payer: COMMERCIAL

## 2024-11-27 DIAGNOSIS — R53.83 FATIGUE, UNSPECIFIED TYPE: ICD-10-CM

## 2024-11-27 DIAGNOSIS — D50.9 IRON DEFICIENCY ANEMIA, UNSPECIFIED: ICD-10-CM

## 2024-11-27 DIAGNOSIS — N93.8 DUB (DYSFUNCTIONAL UTERINE BLEEDING): ICD-10-CM

## 2024-11-27 DIAGNOSIS — K90.9 INTESTINAL MALABSORPTION, UNSPECIFIED: ICD-10-CM

## 2024-11-27 LAB
ALBUMIN SERPL BCP-MCNC: 4.1 G/DL (ref 3.4–5)
ALP SERPL-CCNC: 55 U/L (ref 33–120)
ALT SERPL W P-5'-P-CCNC: 36 U/L (ref 7–52)
ANION GAP SERPL CALC-SCNC: 14 MMOL/L (ref 10–20)
AST SERPL W P-5'-P-CCNC: 20 U/L (ref 9–39)
BASOPHILS # BLD AUTO: 0.06 X10*3/UL (ref 0–0.1)
BASOPHILS NFR BLD AUTO: 0.5 %
BILIRUB SERPL-MCNC: 0.3 MG/DL (ref 0–1.2)
BUN SERPL-MCNC: 17 MG/DL (ref 6–23)
CALCIUM SERPL-MCNC: 9.4 MG/DL (ref 8.6–10.6)
CHLORIDE SERPL-SCNC: 105 MMOL/L (ref 98–107)
CO2 SERPL-SCNC: 22 MMOL/L (ref 21–32)
CREAT SERPL-MCNC: 0.79 MG/DL (ref 0.5–1.3)
EGFRCR SERPLBLD CKD-EPI 2021: >90 ML/MIN/1.73M*2
EOSINOPHIL # BLD AUTO: 0.09 X10*3/UL (ref 0–0.7)
EOSINOPHIL NFR BLD AUTO: 0.7 %
ERYTHROCYTE [DISTWIDTH] IN BLOOD BY AUTOMATED COUNT: 12.9 % (ref 11.5–14.5)
FERRITIN SERPL-MCNC: 345 NG/ML (ref 8–300)
FSH SERPL-ACNC: 2.7 IU/L
GLUCOSE SERPL-MCNC: 100 MG/DL (ref 74–99)
HCT VFR BLD AUTO: 43.7 % (ref 36–52)
HGB BLD-MCNC: 13.7 G/DL (ref 12–17.5)
IMM GRANULOCYTES # BLD AUTO: 0.06 X10*3/UL (ref 0–0.7)
IMM GRANULOCYTES NFR BLD AUTO: 0.5 % (ref 0–0.9)
IRON SATN MFR SERPL: 15 % (ref 25–45)
IRON SERPL-MCNC: 47 UG/DL (ref 35–150)
LH SERPL-ACNC: 2.8 IU/L
LYMPHOCYTES # BLD AUTO: 3.22 X10*3/UL (ref 1.2–4.8)
LYMPHOCYTES NFR BLD AUTO: 26.5 %
MCH RBC QN AUTO: 28.7 PG (ref 26–34)
MCHC RBC AUTO-ENTMCNC: 31.4 G/DL (ref 32–36)
MCV RBC AUTO: 92 FL (ref 80–100)
MONOCYTES # BLD AUTO: 0.62 X10*3/UL (ref 0.1–1)
MONOCYTES NFR BLD AUTO: 5.1 %
NEUTROPHILS # BLD AUTO: 8.08 X10*3/UL (ref 1.2–7.7)
NEUTROPHILS NFR BLD AUTO: 66.7 %
NRBC BLD-RTO: 0 /100 WBCS (ref 0–0)
PLATELET # BLD AUTO: 450 X10*3/UL (ref 150–450)
POTASSIUM SERPL-SCNC: 4.1 MMOL/L (ref 3.5–5.3)
PROGEST SERPL-MCNC: 0.4 NG/ML
PROT SERPL-MCNC: 6.9 G/DL (ref 6.4–8.2)
RBC # BLD AUTO: 4.77 X10*6/UL (ref 4–5.9)
SODIUM SERPL-SCNC: 137 MMOL/L (ref 136–145)
TIBC SERPL-MCNC: 319 UG/DL (ref 240–445)
UIBC SERPL-MCNC: 272 UG/DL (ref 110–370)
WBC # BLD AUTO: 12.1 X10*3/UL (ref 4.4–11.3)

## 2024-11-27 PROCEDURE — 84144 ASSAY OF PROGESTERONE: CPT

## 2024-11-27 PROCEDURE — 82672 ASSAY OF ESTROGEN: CPT

## 2024-11-27 PROCEDURE — 83002 ASSAY OF GONADOTROPIN (LH): CPT

## 2024-11-27 PROCEDURE — 36415 COLL VENOUS BLD VENIPUNCTURE: CPT

## 2024-11-27 PROCEDURE — 85025 COMPLETE CBC W/AUTO DIFF WBC: CPT

## 2024-11-27 PROCEDURE — 80053 COMPREHEN METABOLIC PANEL: CPT

## 2024-11-27 PROCEDURE — 82728 ASSAY OF FERRITIN: CPT

## 2024-11-27 PROCEDURE — 83540 ASSAY OF IRON: CPT

## 2024-11-27 PROCEDURE — 83550 IRON BINDING TEST: CPT

## 2024-11-27 PROCEDURE — 83001 ASSAY OF GONADOTROPIN (FSH): CPT

## 2024-11-29 DIAGNOSIS — K21.9 GASTROESOPHAGEAL REFLUX DISEASE, UNSPECIFIED WHETHER ESOPHAGITIS PRESENT: ICD-10-CM

## 2024-12-02 LAB — ESTROGEN SERPL-MCNC: 75 PG/ML

## 2024-12-02 RX ORDER — PANTOPRAZOLE SODIUM 40 MG/1
40 TABLET, DELAYED RELEASE ORAL DAILY
Qty: 90 TABLET | Refills: 1 | Status: SHIPPED | OUTPATIENT
Start: 2024-12-02

## 2024-12-06 ENCOUNTER — TELEPHONE (OUTPATIENT)
Dept: PRIMARY CARE | Facility: CLINIC | Age: 39
End: 2024-12-06

## 2024-12-06 ENCOUNTER — TELEMEDICINE (OUTPATIENT)
Dept: PRIMARY CARE | Facility: CLINIC | Age: 39
End: 2024-12-06
Payer: COMMERCIAL

## 2024-12-06 DIAGNOSIS — F41.9 ANXIETY: ICD-10-CM

## 2024-12-06 DIAGNOSIS — K21.9 GASTROESOPHAGEAL REFLUX DISEASE, UNSPECIFIED WHETHER ESOPHAGITIS PRESENT: Primary | ICD-10-CM

## 2024-12-06 DIAGNOSIS — E55.9 VITAMIN D DEFICIENCY: ICD-10-CM

## 2024-12-06 DIAGNOSIS — E66.01 OBESITY, CLASS III, BMI 40-49.9 (MORBID OBESITY) (MULTI): ICD-10-CM

## 2024-12-06 DIAGNOSIS — R13.19 ESOPHAGEAL DYSPHAGIA: ICD-10-CM

## 2024-12-06 DIAGNOSIS — J45.909 ASTHMA, UNSPECIFIED ASTHMA SEVERITY, UNSPECIFIED WHETHER COMPLICATED, UNSPECIFIED WHETHER PERSISTENT (HHS-HCC): ICD-10-CM

## 2024-12-06 DIAGNOSIS — J30.1 SEASONAL ALLERGIC RHINITIS DUE TO POLLEN: ICD-10-CM

## 2024-12-06 PROCEDURE — 99214 OFFICE O/P EST MOD 30 MIN: CPT | Performed by: FAMILY MEDICINE

## 2024-12-06 NOTE — PROGRESS NOTES
Subjective   Patient ID: Nilda Lowe is a 39 y.o. adult who presents for Medication Problem.    HPI   Virtual visit requested today top discuss alternate medication to manage stomach ulcer. Patient reports she noticed  2 days ago medication Sucralfate was no longer effective. Patient states she called Tele-Doc and  their recommendation was to take Pantoprazole 2x/day.    Patient reports she followed  PCP recommendation of adding Yogurt to her diet. She is compliant with the recommendation but she states she can't say if it is helping.   Review of Systems  12 Systems have been reviewed as follows.  Constitutional: Fever, weight gain, weight loss, appetite change, night sweats, fatigue, chills.  Eyes : blurry, double vision, vision, loss, tearing, redness, pain, sensitivity to light, glaucoma.  Ears, nose, mouth, and throat: Hearing loss, ringing in the ears, ear pain, nasal congestion, nasal drainage, nosebleeds, mouth, throat, irritation tooth problem.  Cardiovascular :chest pain, pressure, heart racing, palpitations, sweating, leg swelling, high or low blood pressure  Pulmonary: Cough, yellow or green sputum, blood and sputum, shortness of breath, wheezing  Gastrointestinal: Nausea, vomiting, diarrhea, constipation, pain, blood in stool, or vomitus, heartburn, difficulty swallowing  Genitourinary: incontinence, abnormal bleeding, abnormal discharge, urinary frequency, urinary hesitancy, pain, impotence sexual problem, infection, urinary retention  Musculoskeletal: Pain, stiffness, joint, redness or warmth, arthritis, back pain, weakness, muscle wasting, sprain or fracture  Neuro: Weight weakness, dizziness, change in voice, change in taste change in vision, change in hearing, loss, or change of sensation, trouble walking, balance problems coordination problems, shaking, speech problem  Endocrine , cold or heat intolerance, blood sugar problem, weight gain or loss missed periods hot flashes, sweats, change in body  hair, change in libido, increased thirst, increased urination  Heme/lymph: Swelling, bleeding, problem anemia, bruising, enlarged lymph nodes  Allergic/immunologic: H. plus nasal drip, watery itchy eyes, nasal drainage, immunosuppressed  The above were reviewed and noted negative except as noted in HPI and Problem List.    Objective   There were no vitals taken for this visit.    Physical Exam  Constitutional: Well developed, well nourished, alert and in no acute distress   Eyes: Normal external exam. Pupils equally round and reactive to light with normal accommodation and extraocular movements intact.  Neck: Supple, no lymphadenopathy or masses.   Cardiovascular: Regular rate and rhythm, normal S1 and S2, no murmurs, gallops, or rubs. Radial pulses normal. No peripheral edema.  Pulmonary: No respiratory distress, lungs clear to auscultation bilaterally. No wheezes, rhonchi, rales.  Abdomen: soft,non tender, non distended, without masses or HSM  Skin: Warm, well perfused, normal skin turgor and color.   Neurologic: Cranial nerves II-XII grossly intact.   Psychiatric: Mood calm and affect normal  Musculoskeletal: Moving all extremities without restriction    Assessment/Plan   Problem List Items Addressed This Visit             ICD-10-CM    RESOLVED: Allergic rhinitis J30.9    Relevant Orders    Follow Up In [facility] - PCP - Established    Anxiety F41.9    Relevant Orders    Follow Up In [facility] - PCP - Established    Asthma J45.909    Relevant Orders    Follow Up In [facility] - PCP - Established    Dysphagia R13.10    Relevant Orders    Follow Up In [facility] - PCP - Established    GERD (gastroesophageal reflux disease) - Primary K21.9    Relevant Medications    pantoprazole (ProtoNix) 40 mg EC tablet    famotidine (Pepcid) 20 mg tablet    Other Relevant Orders    Follow Up In [facility] - PCP - Established    Vitamin D deficiency E55.9    Relevant Orders     Follow Up In [facility] - PCP - Established    Obesity, Class III, BMI 40-49.9 (morbid obesity) (Multi) E66.01    Relevant Orders    Follow Up In [facility] - PCP - Established   Continue current medications and therapy for chronic medical conditions    Virtual Visit - Audio and Visual Communication Real Time     Take carafate  Carafate helping     Also take lexapro     Consider U/S GB next     seeing Dr. Lea      iron def anemia     xanax prn   using humidifier       get home sleep study in future     Consider adding singular next     Follow up 1 month     Bw next      Consider neurology in future for autism spectrum

## 2024-12-09 ENCOUNTER — TELEPHONE (OUTPATIENT)
Dept: PRIMARY CARE | Facility: CLINIC | Age: 39
End: 2024-12-09

## 2024-12-10 ENCOUNTER — APPOINTMENT (OUTPATIENT)
Dept: PRIMARY CARE | Facility: CLINIC | Age: 39
End: 2024-12-10
Payer: COMMERCIAL

## 2024-12-13 ENCOUNTER — APPOINTMENT (OUTPATIENT)
Dept: PRIMARY CARE | Facility: CLINIC | Age: 39
End: 2024-12-13
Payer: COMMERCIAL

## 2024-12-14 RX ORDER — FAMOTIDINE 20 MG/1
20 TABLET, FILM COATED ORAL 2 TIMES DAILY
Qty: 60 TABLET | Refills: 3 | Status: SHIPPED | OUTPATIENT
Start: 2024-12-14 | End: 2025-04-13

## 2024-12-14 RX ORDER — PANTOPRAZOLE SODIUM 40 MG/1
40 TABLET, DELAYED RELEASE ORAL 2 TIMES DAILY
Qty: 180 TABLET | Refills: 1 | Status: SHIPPED | OUTPATIENT
Start: 2024-12-14

## 2025-01-07 PROCEDURE — RXMED WILLOW AMBULATORY MEDICATION CHARGE

## 2025-01-10 ENCOUNTER — PHARMACY VISIT (OUTPATIENT)
Dept: PHARMACY | Facility: CLINIC | Age: 40
End: 2025-01-10
Payer: COMMERCIAL

## 2025-01-30 DIAGNOSIS — I50.32 CHRONIC DIASTOLIC CONGESTIVE HEART FAILURE: Primary | ICD-10-CM

## 2025-02-06 ENCOUNTER — TELEMEDICINE (OUTPATIENT)
Dept: PHARMACY | Facility: HOSPITAL | Age: 40
End: 2025-02-06
Payer: COMMERCIAL

## 2025-02-06 DIAGNOSIS — J45.909 ASTHMA, UNSPECIFIED ASTHMA SEVERITY, UNSPECIFIED WHETHER COMPLICATED, UNSPECIFIED WHETHER PERSISTENT (HHS-HCC): Primary | ICD-10-CM

## 2025-02-06 DIAGNOSIS — I50.32 CHRONIC DIASTOLIC CONGESTIVE HEART FAILURE: ICD-10-CM

## 2025-02-06 PROCEDURE — RXMED WILLOW AMBULATORY MEDICATION CHARGE

## 2025-02-06 RX ORDER — FLUTICASONE PROPIONATE AND SALMETEROL 500; 50 UG/1; UG/1
1 POWDER RESPIRATORY (INHALATION)
Qty: 180 EACH | Refills: 3 | Status: SHIPPED | OUTPATIENT
Start: 2025-02-06

## 2025-02-06 RX ORDER — ALBUTEROL SULFATE 90 UG/1
1-2 INHALANT RESPIRATORY (INHALATION) EVERY 4 HOURS PRN
Qty: 18 G | Refills: 1 | Status: SHIPPED | OUTPATIENT
Start: 2025-02-06

## 2025-02-06 RX ORDER — TIOTROPIUM BROMIDE 18 UG/1
1 CAPSULE ORAL; RESPIRATORY (INHALATION)
Qty: 90 CAPSULE | Refills: 3 | Status: SHIPPED | OUTPATIENT
Start: 2025-02-06 | End: 2026-02-01

## 2025-02-06 NOTE — ASSESSMENT & PLAN NOTE
ASSESSMENT:  Patient with Asthma that is well controlled and stable. Will continue with current medications.    PLAN:  Medication Changes:  CONTINUE:  Advair 500/50 mcg/dose 1 puff 2 times a day. Will send medication to [facility] for mail order.  Spiriva 18 mcg/inhalation 1 puff daily. Will send medication to [facility] for mail order.  Albuterol 90 mcg/act 1-2 puffs every 4-6 hours as needed for shortness of breath. Will send medication to [facility] for mail order.  Albuterol 2.5mg/3mL nebulizer solution use 3mL every 4-6 hours as needed for wheezing    Albuterol  Education:  Counseled patient on  Albuterol  MOA, expectations, side effects, duration of therapy, administration, and monitoring parameters.  Priming instructions:  Prime inhaler if it is new, has not been used in 7 weeks, or if you drop it.   Remove cap, shake inhaler for 5 seconds, and spray it once away from you; repeat for a total of 4 sprays.  Administration instructions  Remove cap. Place middle or index finger on top of canister and thumb underneath the mouthpiece of the inhaler.   Shake inhaler for 5-10 seconds.  Breathe out fully away from the mouthpiece (in preparation to breathe in medication).  Put the mouthpiece in your mouth and close your lips around it. Do not block the mouthpiece with your teeth or tongue.  Push the top of the canister all the way down one time while breathing in deeply and slowly through your mouth.  Hold your breath for up to 10 seconds, and then breathe out again away from the inhaler.  If your physician has prescribed more than one dose (puff), wait 30 seconds and repeat as above.  Put the cap back on.   To clean, remove cannister from plastic container. Run water through plastic container upside down for 30 seconds. Shake off any excess water and let air dry overnight. Reassemble inhaler.  If inhaler contains a steroid, rinse mouth with water after use. Spit out water. Do not  "swallow.  Side effects include: nose/throat irritation, oral candidiasis, anxiety, high heart rate, dry mouth and/or bitter taste  All questions and concerns addressed.    Spiriva Handihaler Education:  Counseled patient on inhaler MOA, expectations, side effects, duration of therapy, administration, and monitoring parameters.  Preparing the Inhaler for Administration:  Press green button on side open inhaler to expose the mouthpiece  Pull up on mouthpiece ridge to expose the chamber  Open ONE blister of Spiriva capsules and place in the center chamber of the device.   If you expose more than one capsule, unused capsule should be disposed.  Close the mouthpiece until you hear a click.  Administration Instructions:  Hold the inhaler so the mouthpiece is pointing up.  Press the green button on the side of the inhaler again to guallpa the capsule. Only press once  DO NOT shake inhaler.   Breathe out completely away from inhaler.   Raise mouthpiece to your mouth and hold the inhaler in a horizontal position. Careful not to block any air vents.   Breathe in deeply until lungs are full. You will hear/feel the capsule rattling inside the inhaler.  Hold your breath and remove inhaler from mouth.   Breathe out away from inhaler.  With the same capsule in the inhaler, take a second \"puff\" to ensure the full dose was delivered.  Caring for inhaler:  Remove used capsules by exposing the chamber and tipping upside down into a trash can.   If there is buildup within the chamber, gently tap against a surface to release the bulid up.   Inhaler may be rinsed with warm water if needed. Let inhaler to air dry for 24 hours before using again. Make sure that chamber is completely dry before using again.  Do not consume capsules orally!   Addressed all of patients questions and concerns at time of appointment. Encouraged to reach out to PharmD with additional needs.     Advair Diskus Inhaler Education:  Counseled patient on Advair Diskus " MOA, expectations, side effects, duration of therapy, administration, and monitoring parameters.   Administration instructions:  Hold the inhaler in a level, flat position and open it with the other hand.   Place your thumb on the thumb  and push away from you until it clicks. Use your thumb again to push the dose lever away from the mouthpiece as far as it will go until you hear it click.   Exhale away from mouthpiece then close your lips tightly around the mouthpiece. Breath in quickly and deeply through your mouth. While holding your breath, remove the mouthpiece and continue to hold for 10 seconds. Breathe out again slowly away from the device.   Rinse your mouth out with water and spit it out afterwards. Close inhaler cover until it clicks and store in a cool dry place.  To clean, wipe the mouthpiece with cloth.  Side Effects: nose/throat irritation, oral candidiasis, anxiety, high heart rate, dry mouth and/or bitter taste  All questions and concerns addressed.

## 2025-02-06 NOTE — PROGRESS NOTES
"  Clinical Pharmacy Appointment    Patient ID: Keri Lowe \"Nilda\" is a 39 y.o. adult who presents for Asthma.    Pt is here for Follow Up appointment.     Referring Provider: Lambert Bell MD  PCP: Lambert Bell MD   Last visit with PCP: 12/6/2024   Next visit with PCP: not scheduled; encouraged     Patient Assistance for Advair and Spiriva approved through 3/5/2025. Will have to be renewed prior to that date to prevent lapse in coverage. Medication(s) will be received at no cost to patient from [facility].      Subjective     HPI  PULMONARY ASSESSMENT  Patient has been diagnosed with: Asthma  does not see a pulmonologist  has not had PFT's completed in last 2 years    Current Regimen:  Advair 500/50 mcg/dose 1 puff 2 times a day  Spiriva 18 mcg/inhalation 1 puff daily  Albuterol 90 mcg/act 1-2 puffs every 4-6 hours as needed for shortness of breath  Albuterol 2.5mg/3mL nebulizer solution use 3mL every 4-6 hours as needed for wheezing    Clarifications to above regimen: N/a  Adverse Effects: N/a    Symptom Management:  Current symptoms:  sore throat  Triggers: heat; exertion  Alleviating factors: Albuterol inhalers    Exacerbation Hx:  When was your last hospitalization for an exacerbation? N/a  When was the last time you were treated with antibiotics and/or steroids? N/a     Rescue Inhaler Use:  How often do you use your rescue inhaler? 1-2 times per week (emergency use) in the winter; May-September it is hard for her to go outside in the heat  How often do you use your nebulizer? Utilizing more in the summer time/when the sun is shinning    Does patient have appropriate inhaler technique?  Unable to assess over the phone; patient has no questions/concerns    Vaccines:  Influenza: Date [11/29/2022]  PCV13: Date [n/a]  PPSV23: Date [n/a]  PCV20: Date [n/a]  COVID: Date [4/9/2024]  RSV: Date [n/a]    Smoking history  Nilda has never smoked.    Medication Reconciliation:  Changed: Pantoprazole " "(daily)  Discontinued: Albuterol (duplicate order); Alprazolam (duplicate order); Famotadine (no longer taking); Sucralfate (no longer taking)    Drug Interactions  No relevant drug interactions were noted.    Medication System Management  Patient's preferred pharmacy: [facility]  Adherence/Organization: No issues reported  Affordability/Accessibility:  Patient Assistance approved      Objective   Allergies   Allergen Reactions    Cat Dander Unknown     wheezes    Latex Unknown    Pollen Extracts Itching     Social History     Social History Narrative    Not on file      Medication Review  Current Outpatient Medications   Medication Instructions    acetaminophen (Tylenol) 500 mg tablet 2 tablets    albuterol 90 mcg/actuation inhaler 1-2 puffs, inhalation, Every 4 hours PRN, EVERY 4 TO 6 HOURS AS NEEDED.    ALPRAZolam (XANAX) 0.25 mg, oral, 3 times daily PRN    cholecalciferol (VITAMIN D-3) 25 mcg, oral, Daily    escitalopram (LEXAPRO) 10 mg, oral, Every morning    fluticasone propion-salmeteroL (Advair Diskus) 500-50 mcg/dose diskus inhaler 1 puff, inhalation, 2 times daily RT    loratadine 10 mg capsule 1 capsule, oral, Daily    norethindrone-e.estradioL-iron (Microgestin FE 1.5/30) 1.5 mg-30 mcg (21)/75 mg (7) tablet 1 tablet, oral, Daily    pantoprazole (PROTONIX) 40 mg, oral, 2 times daily    tiotropium (SPIRIVA) 18 mcg, inhalation, Daily RT      Vitals  BP Readings from Last 2 Encounters:   03/26/24 128/76   01/26/24 104/70     BMI Readings from Last 1 Encounters:   03/26/24 40.62 kg/m²      Labs  A1C  No results found for: \"HGBA1C\"  BMP  Lab Results   Component Value Date    CALCIUM 9.4 11/27/2024     11/27/2024    K 4.1 11/27/2024    CO2 22 11/27/2024     11/27/2024    BUN 17 11/27/2024    CREATININE 0.79 11/27/2024    EGFR >90 11/27/2024     LFTs  Lab Results   Component Value Date    ALT 36 11/27/2024    AST 20 11/27/2024    ALKPHOS 55 11/27/2024    BILITOT 0.3 11/27/2024     FLP  Lab " "Results   Component Value Date    TRIG 251 (H) 06/27/2024    CHOL 219 (H) 06/27/2024    LDLF 117 (H) 08/09/2022    LDLCALC 126 (H) 06/27/2024    HDL 42.4 06/27/2024     Urine Microalbumin  No results found for: \"MICROALBCREA\"  Weight Management  Wt Readings from Last 3 Encounters:   03/26/24 94.3 kg (208 lb)   01/26/24 91.6 kg (202 lb)   05/17/23 93 kg (205 lb)      There is no height or weight on file to calculate BMI.     Assessment/Plan   Problem List Items Addressed This Visit       Asthma - Primary     ASSESSMENT:  Patient with Asthma that is well controlled and stable. Will continue with current medications.    PLAN:  Medication Changes:  CONTINUE:  Advair 500/50 mcg/dose 1 puff 2 times a day. Will send medication to [facility] for mail order.  Spiriva 18 mcg/inhalation 1 puff daily. Will send medication to [facility] for mail order.  Albuterol 90 mcg/act 1-2 puffs every 4-6 hours as needed for shortness of breath. Will send medication to [facility] for mail order.  Albuterol 2.5mg/3mL nebulizer solution use 3mL every 4-6 hours as needed for wheezing    Albuterol  Education:  Counseled patient on  Albuterol  MOA, expectations, side effects, duration of therapy, administration, and monitoring parameters.  Priming instructions:  Prime inhaler if it is new, has not been used in 7 weeks, or if you drop it.   Remove cap, shake inhaler for 5 seconds, and spray it once away from you; repeat for a total of 4 sprays.  Administration instructions  Remove cap. Place middle or index finger on top of canister and thumb underneath the mouthpiece of the inhaler.   Shake inhaler for 5-10 seconds.  Breathe out fully away from the mouthpiece (in preparation to breathe in medication).  Put the mouthpiece in your mouth and close your lips around it. Do not block the mouthpiece with your teeth or tongue.  Push the top of the canister all the way down one time while breathing in deeply and slowly through " "your mouth.  Hold your breath for up to 10 seconds, and then breathe out again away from the inhaler.  If your physician has prescribed more than one dose (puff), wait 30 seconds and repeat as above.  Put the cap back on.   To clean, remove cannister from plastic container. Run water through plastic container upside down for 30 seconds. Shake off any excess water and let air dry overnight. Reassemble inhaler.  If inhaler contains a steroid, rinse mouth with water after use. Spit out water. Do not swallow.  Side effects include: nose/throat irritation, oral candidiasis, anxiety, high heart rate, dry mouth and/or bitter taste  All questions and concerns addressed.    Spiriva Handihaler Education:  Counseled patient on inhaler MOA, expectations, side effects, duration of therapy, administration, and monitoring parameters.  Preparing the Inhaler for Administration:  Press green button on side open inhaler to expose the mouthpiece  Pull up on mouthpiece ridge to expose the chamber  Open ONE blister of Spiriva capsules and place in the center chamber of the device.   If you expose more than one capsule, unused capsule should be disposed.  Close the mouthpiece until you hear a click.  Administration Instructions:  Hold the inhaler so the mouthpiece is pointing up.  Press the green button on the side of the inhaler again to guallpa the capsule. Only press once  DO NOT shake inhaler.   Breathe out completely away from inhaler.   Raise mouthpiece to your mouth and hold the inhaler in a horizontal position. Careful not to block any air vents.   Breathe in deeply until lungs are full. You will hear/feel the capsule rattling inside the inhaler.  Hold your breath and remove inhaler from mouth.   Breathe out away from inhaler.  With the same capsule in the inhaler, take a second \"puff\" to ensure the full dose was delivered.  Caring for inhaler:  Remove used capsules by exposing the chamber and tipping upside down into a trash can. "   If there is buildup within the chamber, gently tap against a surface to release the bulid up.   Inhaler may be rinsed with warm water if needed. Let inhaler to air dry for 24 hours before using again. Make sure that chamber is completely dry before using again.  Do not consume capsules orally!   Addressed all of patients questions and concerns at time of appointment. Encouraged to reach out to PharmD with additional needs.     Advair Diskus Inhaler Education:  Counseled patient on Advair Diskus MOA, expectations, side effects, duration of therapy, administration, and monitoring parameters.   Administration instructions:  Hold the inhaler in a level, flat position and open it with the other hand.   Place your thumb on the thumb  and push away from you until it clicks. Use your thumb again to push the dose lever away from the mouthpiece as far as it will go until you hear it click.   Exhale away from mouthpiece then close your lips tightly around the mouthpiece. Breath in quickly and deeply through your mouth. While holding your breath, remove the mouthpiece and continue to hold for 10 seconds. Breathe out again slowly away from the device.   Rinse your mouth out with water and spit it out afterwards. Close inhaler cover until it clicks and store in a cool dry place.  To clean, wipe the mouthpiece with cloth.  Side Effects: nose/throat irritation, oral candidiasis, anxiety, high heart rate, dry mouth and/or bitter taste  All questions and concerns addressed.         Relevant Medications    tiotropium (Spiriva) 18 mcg inhalation capsule    fluticasone propion-salmeteroL (Advair Diskus) 500-50 mcg/dose diskus inhaler    albuterol 90 mcg/actuation inhaler     Other Visit Diagnoses       Chronic diastolic congestive heart failure                Clinical Pharmacist follow-up: once there has been a determination on  Patient Assistance, Telehealth visit    Continue all meds under the continuation of care with the  referring provider and clinical pharmacy team.    Thank you,  Irma Albright, PharmD  Clinical Pharmacist  [phone]    Verbal consent to manage patient's drug therapy was obtained from the patient. They were informed they may decline to participate or withdraw from participation in pharmacy services at any time.

## 2025-02-07 ENCOUNTER — PHARMACY VISIT (OUTPATIENT)
Dept: PHARMACY | Facility: CLINIC | Age: 40
End: 2025-02-07
Payer: COMMERCIAL

## 2025-02-10 DIAGNOSIS — F41.9 ANXIETY: ICD-10-CM

## 2025-02-10 NOTE — TELEPHONE ENCOUNTER
Recent Visits  Date Type Provider Dept   03/26/24 Office Visit Lambert Bell MD Do [facility]   Showing recent visits within past 365 days and meeting all other requirements  Future Appointments  No visits were found meeting these conditions.  Showing future appointments within next 90 days and meeting all other requirements

## 2025-02-24 RX ORDER — ESCITALOPRAM OXALATE 10 MG/1
10 TABLET ORAL DAILY
Qty: 30 TABLET | Refills: 2 | Status: SHIPPED | OUTPATIENT
Start: 2025-02-24

## 2025-02-24 NOTE — TELEPHONE ENCOUNTER
Recent Visits  Date Type Provider Dept   03/26/24 Office Visit Lambert Bell MD Do [facility]   Showing recent visits within past 365 days and meeting all other requirements  Future Appointments  No visits were found meeting these conditions.  Showing future appointments within next 90 days and meeting all other requirements

## 2025-02-25 DIAGNOSIS — N93.8 DUB (DYSFUNCTIONAL UTERINE BLEEDING): ICD-10-CM

## 2025-02-25 DIAGNOSIS — J45.909 ASTHMA, UNSPECIFIED ASTHMA SEVERITY, UNSPECIFIED WHETHER COMPLICATED, UNSPECIFIED WHETHER PERSISTENT (HHS-HCC): ICD-10-CM

## 2025-02-25 NOTE — TELEPHONE ENCOUNTER
Dr Bell pt    Pt phoned office and is requesting refill on     Albuterol inhaler  Norethindrone- e. Estradiol     [facility]

## 2025-02-28 DIAGNOSIS — N93.8 DUB (DYSFUNCTIONAL UTERINE BLEEDING): ICD-10-CM

## 2025-02-28 DIAGNOSIS — J45.909 ASTHMA, UNSPECIFIED ASTHMA SEVERITY, UNSPECIFIED WHETHER COMPLICATED, UNSPECIFIED WHETHER PERSISTENT (HHS-HCC): ICD-10-CM

## 2025-02-28 RX ORDER — NORETHINDRONE ACETATE AND ETHINYL ESTRADIOL, AND FERROUS FUMARATE 1.5-30(21)
1 KIT ORAL DAILY
Qty: 84 TABLET | Refills: 1 | Status: SHIPPED | OUTPATIENT
Start: 2025-02-28

## 2025-02-28 RX ORDER — ALBUTEROL SULFATE 90 UG/1
1-2 INHALANT RESPIRATORY (INHALATION) EVERY 4 HOURS PRN
Qty: 18 G | Refills: 11 | Status: SHIPPED | OUTPATIENT
Start: 2025-02-28

## 2025-02-28 RX ORDER — NORETHINDRONE ACETATE AND ETHINYL ESTRADIOL 1.5-30(21)
1 KIT ORAL DAILY
Qty: 84 TABLET | Refills: 1 | Status: SHIPPED | OUTPATIENT
Start: 2025-02-28 | End: 2025-02-28

## 2025-02-28 RX ORDER — ALBUTEROL SULFATE 90 UG/1
1-2 INHALANT RESPIRATORY (INHALATION) EVERY 4 HOURS PRN
Qty: 18 G | Refills: 1 | Status: SHIPPED | OUTPATIENT
Start: 2025-02-28 | End: 2025-02-28 | Stop reason: SDUPTHER

## 2025-03-05 ENCOUNTER — TELEMEDICINE (OUTPATIENT)
Dept: PRIMARY CARE | Facility: CLINIC | Age: 40
End: 2025-03-05
Payer: COMMERCIAL

## 2025-03-05 DIAGNOSIS — E66.01 OBESITY, CLASS III, BMI 40-49.9 (MORBID OBESITY) (MULTI): Primary | ICD-10-CM

## 2025-03-05 DIAGNOSIS — J45.909 ASTHMA, UNSPECIFIED ASTHMA SEVERITY, UNSPECIFIED WHETHER COMPLICATED, UNSPECIFIED WHETHER PERSISTENT (HHS-HCC): ICD-10-CM

## 2025-03-05 DIAGNOSIS — G47.33 OBSTRUCTIVE SLEEP APNEA, ADULT: ICD-10-CM

## 2025-03-05 DIAGNOSIS — F41.9 ANXIETY: ICD-10-CM

## 2025-03-05 DIAGNOSIS — J40 BRONCHITIS: ICD-10-CM

## 2025-03-05 DIAGNOSIS — D50.8 OTHER IRON DEFICIENCY ANEMIA: ICD-10-CM

## 2025-03-05 PROCEDURE — 99214 OFFICE O/P EST MOD 30 MIN: CPT | Performed by: FAMILY MEDICINE

## 2025-03-05 NOTE — PROGRESS NOTES
Subjective   Patient ID: Nilda Lowe is a 39 y.o. adult who presents for Cough.    HPI   Virtual visit requested today to discuss cough> patient reports that 3 weeks ago she had a cold and she treated with some OTC cold medication. At this moment she still presenting cough and she called Telemedicine during the past weekend looking for some recommendations. Patient had an steroid prescription and she is on her last day of treatment.    Patient would like to discuss possible antibiotic  treatment today.  Review of Systems    Objective   There were no vitals taken for this visit.    Physical Exam    Assessment/Plan   {Assess/PlanSmartLinks:88129}        thirst, increased urination  Heme/lymph: Swelling, bleeding, problem anemia, bruising, enlarged lymph nodes  Allergic/immunologic: H. plus nasal drip, watery itchy eyes, nasal drainage, immunosuppressed  The above were reviewed and noted negative except as noted in HPI and Problem List.    Objective   There were no vitals taken for this visit.    Physical Exam    Assessment/Plan   Problem List Items Addressed This Visit             ICD-10-CM    Anxiety F41.9    Relevant Orders    Follow Up In [facility] - PCP - Established    Asthma J45.909    Relevant Orders    Follow Up In [facility] - PCP - Established    Iron deficiency anemia D50.9    Relevant Orders    Follow Up In [facility] - PCP - Established    Obstructive sleep apnea, adult G47.33    Relevant Orders    Follow Up In [facility] - PCP - Established    Obesity, Class III, BMI 40-49.9 (morbid obesity) (Multi) - Primary E66.01    Relevant Orders    Follow Up In [facility] - PCP - Established     Other Visit Diagnoses         Codes    Bronchitis     J40    Relevant Orders    Follow Up In [facility] - PCP - Established          Take carafate  Carafate helping     Also take lexapro     Consider U/S GB next     seeing Dr. Lea      iron def anemia     xanax prn   using humidifier       get home sleep study in future     Consider adding singular next     Follow up 1 month     Bw next      Consider neurology in future for autism spectrum  Continue current medications and therapy for chronic medical conditions    Virtual Visit - Audio and Visual Communication Real Time     I have personally reviewed the [facility] report with the patient and have considered the risk of abuse, addiction, dependence and diversion.    Patient's use of medication is allowing patient to be able to perform ADL's. Patient is always being evaluated for the possibility of lowering the medication dosage.    See med orders

## 2025-03-06 ENCOUNTER — TELEMEDICINE (OUTPATIENT)
Dept: PHARMACY | Facility: HOSPITAL | Age: 40
End: 2025-03-06
Payer: COMMERCIAL

## 2025-03-06 DIAGNOSIS — J45.909 ASTHMA, UNSPECIFIED ASTHMA SEVERITY, UNSPECIFIED WHETHER COMPLICATED, UNSPECIFIED WHETHER PERSISTENT (HHS-HCC): ICD-10-CM

## 2025-03-06 DIAGNOSIS — J45.909 ASTHMA, UNSPECIFIED ASTHMA SEVERITY, UNSPECIFIED WHETHER COMPLICATED, UNSPECIFIED WHETHER PERSISTENT (HHS-HCC): Primary | ICD-10-CM

## 2025-03-06 DIAGNOSIS — I50.32 CHRONIC DIASTOLIC CONGESTIVE HEART FAILURE: ICD-10-CM

## 2025-03-06 NOTE — ASSESSMENT & PLAN NOTE
ASSESSMENT:  Patient with Asthma that is well controlled and stable. Will continue with current medications.    PLAN:  Medication Changes:  CONTINUE:  Advair 500/50 mcg/dose 1 puff 2 times a day.   Spiriva 18 mcg/inhalation 1 puff daily.   Albuterol 90 mcg/act 1-2 puffs every 4-6 hours as needed for shortness of breath.   Albuterol 2.5mg/3mL nebulizer solution use 3mL every 4-6 hours as needed for wheezing    Albuterol  Education:  Counseled patient on  Albuterol  MOA, expectations, side effects, duration of therapy, administration, and monitoring parameters.  Priming instructions:  Prime inhaler if it is new, has not been used in 7 weeks, or if you drop it.   Remove cap, shake inhaler for 5 seconds, and spray it once away from you; repeat for a total of 4 sprays.  Administration instructions  Remove cap. Place middle or index finger on top of canister and thumb underneath the mouthpiece of the inhaler.   Shake inhaler for 5-10 seconds.  Breathe out fully away from the mouthpiece (in preparation to breathe in medication).  Put the mouthpiece in your mouth and close your lips around it. Do not block the mouthpiece with your teeth or tongue.  Push the top of the canister all the way down one time while breathing in deeply and slowly through your mouth.  Hold your breath for up to 10 seconds, and then breathe out again away from the inhaler.  If your physician has prescribed more than one dose (puff), wait 30 seconds and repeat as above.  Put the cap back on.   To clean, remove cannister from plastic container. Run water through plastic container upside down for 30 seconds. Shake off any excess water and let air dry overnight. Reassemble inhaler.  If inhaler contains a steroid, rinse mouth with water after use. Spit out water. Do not swallow.  Side effects include: nose/throat irritation, oral candidiasis, anxiety, high heart rate, dry mouth and/or bitter taste  All questions and concerns addressed.    Spiriva  "Handihaler Education:  Counseled patient on inhaler MOA, expectations, side effects, duration of therapy, administration, and monitoring parameters.  Preparing the Inhaler for Administration:  Press green button on side open inhaler to expose the mouthpiece  Pull up on mouthpiece ridge to expose the chamber  Open ONE blister of Spiriva capsules and place in the center chamber of the device.   If you expose more than one capsule, unused capsule should be disposed.  Close the mouthpiece until you hear a click.  Administration Instructions:  Hold the inhaler so the mouthpiece is pointing up.  Press the green button on the side of the inhaler again to guallpa the capsule. Only press once  DO NOT shake inhaler.   Breathe out completely away from inhaler.   Raise mouthpiece to your mouth and hold the inhaler in a horizontal position. Careful not to block any air vents.   Breathe in deeply until lungs are full. You will hear/feel the capsule rattling inside the inhaler.  Hold your breath and remove inhaler from mouth.   Breathe out away from inhaler.  With the same capsule in the inhaler, take a second \"puff\" to ensure the full dose was delivered.  Caring for inhaler:  Remove used capsules by exposing the chamber and tipping upside down into a trash can.   If there is buildup within the chamber, gently tap against a surface to release the bulid up.   Inhaler may be rinsed with warm water if needed. Let inhaler to air dry for 24 hours before using again. Make sure that chamber is completely dry before using again.  Do not consume capsules orally!   Addressed all of patients questions and concerns at time of appointment. Encouraged to reach out to PharmD with additional needs.     Advair Diskus Inhaler Education:  Counseled patient on Advair Diskus MOA, expectations, side effects, duration of therapy, administration, and monitoring parameters.   Administration instructions:  Hold the inhaler in a level, flat position and open " it with the other hand.   Place your thumb on the thumb  and push away from you until it clicks. Use your thumb again to push the dose lever away from the mouthpiece as far as it will go until you hear it click.   Exhale away from mouthpiece then close your lips tightly around the mouthpiece. Breath in quickly and deeply through your mouth. While holding your breath, remove the mouthpiece and continue to hold for 10 seconds. Breathe out again slowly away from the device.   Rinse your mouth out with water and spit it out afterwards. Close inhaler cover until it clicks and store in a cool dry place.  To clean, wipe the mouthpiece with cloth.  Side Effects: nose/throat irritation, oral candidiasis, anxiety, high heart rate, dry mouth and/or bitter taste  All questions and concerns addressed.

## 2025-03-06 NOTE — PROGRESS NOTES
"  Clinical Pharmacy Appointment    Patient ID: Keri Lowe \"Nilda\" is a 39 y.o. adult who presents for Asthma.    Pt is here for Follow Up appointment.     Referring Provider: Lambert Bell MD  PCP: Lambert Bell MD   Last visit with PCP: 12/6/2024   Next visit with PCP: not scheduled; encouraged     Patient Assistance for Advair and Spiriva approved through 3/6/2026. Will have to be renewed prior to that date to prevent lapse in coverage. Medication(s) will be received at no cost to [facility] from [facility].      Subjective     HPI  PULMONARY ASSESSMENT  Patient has been diagnosed with: Asthma  does not see a pulmonologist  has not had PFT's completed in last 2 years    Current Regimen:  Advair 500/50 mcg/dose 1 puff 2 times a day  Spiriva 18 mcg/inhalation 1 puff daily  Albuterol 90 mcg/act 1-2 puffs every 4-6 hours as needed for shortness of breath  Albuterol 2.5mg/3mL nebulizer solution use 3mL every 4-6 hours as needed for wheezing    Clarifications to above regimen: N/a  Adverse Effects: N/a    Symptom Management:  Current symptoms:  sore throat  Triggers: heat; exertion  Alleviating factors: Albuterol inhalers    Exacerbation Hx:  When was your last hospitalization for an exacerbation? N/a  When was the last time you were treated with antibiotics and/or steroids? N/a     Rescue Inhaler Use:  How often do you use your rescue inhaler? 1-2 times per week (emergency use) in the winter; May-September it is hard for her to go outside in the heat  How often do you use your nebulizer? Utilizing more in the summer time/when the sun is shinning    Does patient have appropriate inhaler technique?  Unable to assess over the phone; patient has no questions/concerns    Vaccines:  Influenza: Date [11/29/2022]  PCV13: Date [n/a]  PPSV23: Date [n/a]  PCV20: Date [n/a]  COVID: Date [4/9/2024]  RSV: Date [n/a]    Smoking history  Nilda has never smoked.    Medication Reconciliation:  Changed: Pantoprazole " "(daily)  Discontinued: Albuterol (duplicate order); Alprazolam (duplicate order); Famotadine (no longer taking); Sucralfate (no longer taking)    Drug Interactions  No relevant drug interactions were noted.    Medication System Management  Patient's preferred pharmacy: [facility]  Adherence/Organization: No issues reported  Affordability/Accessibility:  Patient Assistance approved      Objective   Allergies   Allergen Reactions    Cat Dander Unknown     wheezes    Latex Unknown    Pollen Extracts Itching     Social History     Social History Narrative    Not on file      Medication Review  Current Outpatient Medications   Medication Instructions    acetaminophen (Tylenol) 500 mg tablet 2 tablets    albuterol 90 mcg/actuation inhaler 1-2 puffs, inhalation, Every 4 hours PRN, EVERY 4 TO 6 HOURS AS NEEDED.    ALPRAZolam (XANAX) 0.25 mg, oral, 3 times daily PRN    cholecalciferol (VITAMIN D-3) 25 mcg, oral, Daily    escitalopram (LEXAPRO) 10 mg, oral, Daily    fluticasone propion-salmeteroL (Advair Diskus) 500-50 mcg/dose diskus inhaler 1 puff, inhalation, 2 times daily RT    Junel FE 1.5/30, 28, 1.5 mg-30 mcg (21)/75 mg (7) tablet 1 tablet, oral, Daily    loratadine 10 mg capsule 1 capsule, oral, Daily    pantoprazole (PROTONIX) 40 mg, oral, 2 times daily    tiotropium (SPIRIVA) 18 mcg, inhalation, Daily RT      Vitals  BP Readings from Last 2 Encounters:   03/26/24 128/76   01/26/24 104/70     BMI Readings from Last 1 Encounters:   03/26/24 40.62 kg/m²      Labs  A1C  No results found for: \"HGBA1C\"  BMP  Lab Results   Component Value Date    CALCIUM 9.4 11/27/2024     11/27/2024    K 4.1 11/27/2024    CO2 22 11/27/2024     11/27/2024    BUN 17 11/27/2024    CREATININE 0.79 11/27/2024    EGFR >90 11/27/2024     LFTs  Lab Results   Component Value Date    ALT 36 11/27/2024    AST 20 11/27/2024    ALKPHOS 55 11/27/2024    BILITOT 0.3 11/27/2024     FLP  Lab Results   Component Value Date    TRIG 251 (H) " "06/27/2024    CHOL 219 (H) 06/27/2024    LDLF 117 (H) 08/09/2022    LDLCALC 126 (H) 06/27/2024    HDL 42.4 06/27/2024     Urine Microalbumin  No results found for: \"MICROALBCREA\"  Weight Management  Wt Readings from Last 3 Encounters:   03/26/24 94.3 kg (208 lb)   01/26/24 91.6 kg (202 lb)   05/17/23 93 kg (205 lb)      There is no height or weight on file to calculate BMI.     Assessment/Plan   Problem List Items Addressed This Visit       Asthma     ASSESSMENT:  Patient with Asthma that is well controlled and stable. Will continue with current medications.    PLAN:  Medication Changes:  CONTINUE:  Advair 500/50 mcg/dose 1 puff 2 times a day.   Spiriva 18 mcg/inhalation 1 puff daily.   Albuterol 90 mcg/act 1-2 puffs every 4-6 hours as needed for shortness of breath.   Albuterol 2.5mg/3mL nebulizer solution use 3mL every 4-6 hours as needed for wheezing    Albuterol  Education:  Counseled patient on  Albuterol  MOA, expectations, side effects, duration of therapy, administration, and monitoring parameters.  Priming instructions:  Prime inhaler if it is new, has not been used in 7 weeks, or if you drop it.   Remove cap, shake inhaler for 5 seconds, and spray it once away from you; repeat for a total of 4 sprays.  Administration instructions  Remove cap. Place middle or index finger on top of canister and thumb underneath the mouthpiece of the inhaler.   Shake inhaler for 5-10 seconds.  Breathe out fully away from the mouthpiece (in preparation to breathe in medication).  Put the mouthpiece in your mouth and close your lips around it. Do not block the mouthpiece with your teeth or tongue.  Push the top of the canister all the way down one time while breathing in deeply and slowly through your mouth.  Hold your breath for up to 10 seconds, and then breathe out again away from the inhaler.  If your physician has prescribed more than one dose (puff), wait 30 seconds and repeat as above.  Put the cap back on.   To clean, " "remove cannister from plastic container. Run water through plastic container upside down for 30 seconds. Shake off any excess water and let air dry overnight. Reassemble inhaler.  If inhaler contains a steroid, rinse mouth with water after use. Spit out water. Do not swallow.  Side effects include: nose/throat irritation, oral candidiasis, anxiety, high heart rate, dry mouth and/or bitter taste  All questions and concerns addressed.    Spiriva Handihaler Education:  Counseled patient on inhaler MOA, expectations, side effects, duration of therapy, administration, and monitoring parameters.  Preparing the Inhaler for Administration:  Press green button on side open inhaler to expose the mouthpiece  Pull up on mouthpiece ridge to expose the chamber  Open ONE blister of Spiriva capsules and place in the center chamber of the device.   If you expose more than one capsule, unused capsule should be disposed.  Close the mouthpiece until you hear a click.  Administration Instructions:  Hold the inhaler so the mouthpiece is pointing up.  Press the green button on the side of the inhaler again to guallpa the capsule. Only press once  DO NOT shake inhaler.   Breathe out completely away from inhaler.   Raise mouthpiece to your mouth and hold the inhaler in a horizontal position. Careful not to block any air vents.   Breathe in deeply until lungs are full. You will hear/feel the capsule rattling inside the inhaler.  Hold your breath and remove inhaler from mouth.   Breathe out away from inhaler.  With the same capsule in the inhaler, take a second \"puff\" to ensure the full dose was delivered.  Caring for inhaler:  Remove used capsules by exposing the chamber and tipping upside down into a trash can.   If there is buildup within the chamber, gently tap against a surface to release the bulid up.   Inhaler may be rinsed with warm water if needed. Let inhaler to air dry for 24 hours before using again. Make sure that chamber is " completely dry before using again.  Do not consume capsules orally!   Addressed all of patients questions and concerns at time of appointment. Encouraged to reach out to PharmD with additional needs.     Advair Diskus Inhaler Education:  Counseled patient on Advair Diskus MOA, expectations, side effects, duration of therapy, administration, and monitoring parameters.   Administration instructions:  Hold the inhaler in a level, flat position and open it with the other hand.   Place your thumb on the thumb  and push away from you until it clicks. Use your thumb again to push the dose lever away from the mouthpiece as far as it will go until you hear it click.   Exhale away from mouthpiece then close your lips tightly around the mouthpiece. Breath in quickly and deeply through your mouth. While holding your breath, remove the mouthpiece and continue to hold for 10 seconds. Breathe out again slowly away from the device.   Rinse your mouth out with water and spit it out afterwards. Close inhaler cover until it clicks and store in a cool dry place.  To clean, wipe the mouthpiece with cloth.  Side Effects: nose/throat irritation, oral candidiasis, anxiety, high heart rate, dry mouth and/or bitter taste  All questions and concerns addressed.            Clinical Pharmacist follow-up: as needed by the patient or PCP, Telehealth visit    Continue all meds under the continuation of care with the referring provider and clinical pharmacy team.    Thank you,  Irma Albright, PharmD  Clinical Pharmacist  [phone]    Verbal consent to manage patient's drug therapy was obtained from the patient. They were informed they may decline to participate or withdraw from participation in pharmacy services at any time.

## 2025-03-07 DIAGNOSIS — J40 BRONCHITIS: Primary | ICD-10-CM

## 2025-03-07 DIAGNOSIS — J45.909 ASTHMA, UNSPECIFIED ASTHMA SEVERITY, UNSPECIFIED WHETHER COMPLICATED, UNSPECIFIED WHETHER PERSISTENT (HHS-HCC): ICD-10-CM

## 2025-03-07 NOTE — TELEPHONE ENCOUNTER
PT OF BRETT RODRIGUEZ HAS A SCHED VV ON 03/5 AND WASN'T CALLED, SHE WOULD LIKE FOR BRETT TO CALL HER TODAY/

## 2025-03-17 NOTE — TELEPHONE ENCOUNTER
Dr Bell pt    Pt phoned office and is requesting refill on    Benzonatate  Amoxicillin    [address]    Pt is feeling better but not all the way.

## 2025-03-18 RX ORDER — AMOXICILLIN AND CLAVULANATE POTASSIUM 875; 125 MG/1; MG/1
875 TABLET, FILM COATED ORAL 2 TIMES DAILY
Qty: 20 TABLET | Refills: 0 | Status: SHIPPED | OUTPATIENT
Start: 2025-03-18 | End: 2025-03-28

## 2025-03-18 RX ORDER — BENZONATATE 100 MG/1
100 CAPSULE ORAL 4 TIMES DAILY PRN
Qty: 20 CAPSULE | Refills: 1 | Status: SHIPPED | OUTPATIENT
Start: 2025-03-18

## 2025-04-02 DIAGNOSIS — J45.909 ASTHMA, UNSPECIFIED ASTHMA SEVERITY, UNSPECIFIED WHETHER COMPLICATED, UNSPECIFIED WHETHER PERSISTENT (HHS-HCC): ICD-10-CM

## 2025-04-02 RX ORDER — BENZONATATE 100 MG/1
100 CAPSULE ORAL 4 TIMES DAILY PRN
Qty: 20 CAPSULE | Refills: 1 | Status: SHIPPED | OUTPATIENT
Start: 2025-04-02

## 2025-04-02 NOTE — TELEPHONE ENCOUNTER
Pt requesting refill on   benzonatate (Tessalon) 100 mg capsule   [id number] - [place], [place] - [address] AT [facility]

## 2025-04-03 PROCEDURE — RXMED WILLOW AMBULATORY MEDICATION CHARGE

## 2025-04-04 ENCOUNTER — PHARMACY VISIT (OUTPATIENT)
Dept: PHARMACY | Facility: CLINIC | Age: 40
End: 2025-04-04
Payer: COMMERCIAL

## 2025-04-15 DIAGNOSIS — F41.9 ANXIETY: ICD-10-CM

## 2025-04-15 RX ORDER — ESCITALOPRAM OXALATE 10 MG/1
10 TABLET ORAL DAILY
Qty: 30 TABLET | Refills: 2 | Status: SHIPPED | OUTPATIENT
Start: 2025-04-15

## 2025-04-15 NOTE — TELEPHONE ENCOUNTER
[id number] - [place], [place] - [address] [facility]   Pt needs refill on  escitalopram (Lexapro) 10 mg tablet

## 2025-04-17 ENCOUNTER — APPOINTMENT (OUTPATIENT)
Dept: PRIMARY CARE | Facility: CLINIC | Age: 40
End: 2025-04-17
Payer: COMMERCIAL

## 2025-04-17 VITALS
RESPIRATION RATE: 16 BRPM | HEIGHT: 60 IN | TEMPERATURE: 98.2 F | OXYGEN SATURATION: 97 % | BODY MASS INDEX: 39.27 KG/M2 | DIASTOLIC BLOOD PRESSURE: 72 MMHG | HEART RATE: 88 BPM | SYSTOLIC BLOOD PRESSURE: 128 MMHG | WEIGHT: 200 LBS

## 2025-04-17 DIAGNOSIS — E66.01 OBESITY, CLASS III, BMI 40-49.9 (MORBID OBESITY) (MULTI): ICD-10-CM

## 2025-04-17 DIAGNOSIS — J40 BRONCHITIS: ICD-10-CM

## 2025-04-17 DIAGNOSIS — N93.8 DUB (DYSFUNCTIONAL UTERINE BLEEDING): ICD-10-CM

## 2025-04-17 DIAGNOSIS — D50.8 OTHER IRON DEFICIENCY ANEMIA: ICD-10-CM

## 2025-04-17 DIAGNOSIS — J45.909 ASTHMA, UNSPECIFIED ASTHMA SEVERITY, UNSPECIFIED WHETHER COMPLICATED, UNSPECIFIED WHETHER PERSISTENT (HHS-HCC): ICD-10-CM

## 2025-04-17 DIAGNOSIS — E55.9 VITAMIN D DEFICIENCY: Primary | ICD-10-CM

## 2025-04-17 DIAGNOSIS — K21.9 GASTROESOPHAGEAL REFLUX DISEASE, UNSPECIFIED WHETHER ESOPHAGITIS PRESENT: ICD-10-CM

## 2025-04-17 DIAGNOSIS — E78.2 MIXED HYPERLIPIDEMIA: ICD-10-CM

## 2025-04-17 DIAGNOSIS — F41.9 ANXIETY: ICD-10-CM

## 2025-04-17 DIAGNOSIS — G47.33 OBSTRUCTIVE SLEEP APNEA, ADULT: ICD-10-CM

## 2025-04-17 PROCEDURE — 99214 OFFICE O/P EST MOD 30 MIN: CPT | Performed by: FAMILY MEDICINE

## 2025-04-17 ASSESSMENT — PATIENT HEALTH QUESTIONNAIRE - PHQ9
SUM OF ALL RESPONSES TO PHQ9 QUESTIONS 1 AND 2: 0
1. LITTLE INTEREST OR PLEASURE IN DOING THINGS: NOT AT ALL
2. FEELING DOWN, DEPRESSED OR HOPELESS: NOT AT ALL

## 2025-04-17 ASSESSMENT — ENCOUNTER SYMPTOMS
DEPRESSION: 0
LOSS OF SENSATION IN FEET: 0
OCCASIONAL FEELINGS OF UNSTEADINESS: 0

## 2025-04-17 NOTE — PROGRESS NOTES
"Subjective   Patient ID: Keri Lowe \"Nilda\" is a 39 y.o. adult who presents for Annual Exam (Patient is at the office today for Annual Exam.//Patioent repports  that she re started treatment for Anxiety. Patient states no negative side effects from medication.//Patient reports Iron levels went up 1 point a month ago.) and Asthma (Patient reports being compliant with medications prescribed to manage Asthma symptoms. ).  History of Present Illness  Nilda is a 39-year-old with asthma and anxiety presenting for a regular follow-up visit.    Their anxiety medication was inadvertently stopped due to a focus on a sinus infection, leading to a lapse in medication adherence. They have since resumed their medication, which primarily aids in sleep. They have not used Xanax since September, indicating improved anxiety management.    Asthma is stable but triggered by high temperatures and sunlight, preventing them from being outside between 11 AM and 6 PM. They have not needed to use their inhaler since their sinus infection. They adjust their schedule to work at night to avoid asthma triggers.    They mention a history of anemia, with recent blood tests at a cancer center showing an improvement in their iron levels, which is unusual for them as they typically have low levels. They plan to have further blood work done soon.    They have not completed a home sleep study yet, preferring to wait for cooler temperatures in the fall to proceed with it.    They inquire about their annual COVID vaccination, which they typically receive due to their asthma, and plan to get it in the fall.    Review of Systems  12 Systems have been reviewed as follows.  Constitutional: Fever, weight gain, weight loss, appetite change, night sweats, fatigue, chills.  Eyes : blurry, double vision, vision, loss, tearing, redness, pain, sensitivity to light, glaucoma.  Ears, nose, mouth, and throat: Hearing loss, ringing in the ears, ear pain, nasal " congestion, nasal drainage, nosebleeds, mouth, throat, irritation tooth problem.  Cardiovascular :chest pain, pressure, heart racing, palpitations, sweating, leg swelling, high or low blood pressure  Pulmonary: Cough, yellow or green sputum, blood and sputum, shortness of breath, wheezing  Gastrointestinal: Nausea, vomiting, diarrhea, constipation, pain, blood in stool, or vomitus, heartburn, difficulty swallowing  Genitourinary: incontinence, abnormal bleeding, abnormal discharge, urinary frequency, urinary hesitancy, pain, impotence sexual problem, infection, urinary retention  Musculoskeletal: Pain, stiffness, joint, redness or warmth, arthritis, back pain, weakness, muscle wasting, sprain or fracture  Neuro: Weight weakness, dizziness, change in voice, change in taste change in vision, change in hearing, loss, or change of sensation, trouble walking, balance problems coordination problems, shaking, speech problem  Endocrine , cold or heat intolerance, blood sugar problem, weight gain or loss missed periods hot flashes, sweats, change in body hair, change in libido, increased thirst, increased urination  Heme/lymph: Swelling, bleeding, problem anemia, bruising, enlarged lymph nodes  Allergic/immunologic: H. plus nasal drip, watery itchy eyes, nasal drainage, immunosuppressed  The above were reviewed and noted negative except as noted in HPI and Problem List.    Objective     /72 (BP Location: Left arm, Patient Position: Sitting, BP Cuff Size: Large adult)   Pulse 88   Temp 36.8 °C (98.2 °F) (Temporal)   Resp 16   Ht 1.524 m (5')   Wt 90.7 kg (200 lb)   SpO2 97%   BMI 39.06 kg/m²      Physical Exam    Constitutional: Well developed, well nourished, alert and in no acute distress   Eyes: Normal external exam. Pupils equally round and reactive to light with normal accommodation and extraocular movements intact.  Neck: Supple, no lymphadenopathy or masses.   Cardiovascular: Regular rate and rhythm,  normal S1 and S2, no murmurs, gallops, or rubs. Radial pulses normal. No peripheral edema.  Pulmonary: No respiratory distress, lungs clear to auscultation bilaterally. No wheezes, rhonchi, rales.  Abdomen: soft,non tender, non distended, without masses or HSM  Skin: Warm, well perfused, normal skin turgor and color.   Neurologic: Cranial nerves II-XII grossly intact.   Psychiatric: Mood calm and affect normal  Musculoskeletal: Moving all extremities without restriction  The above were reviewed and noted negative except as noted in HPI and Problem List.      Results  LABS  Iron: Increased by 1 point (03/2025)         Assessment & Plan  Asthma  Asthma is well-controlled, with symptoms triggered by high temperatures and sunlight, limiting outdoor activities between 11 AM and 6 PM. Inhaler use has ceased since the sinus infection, indicating stable control.  - Avoid outdoor activities during high temperatures and sunlight exposure.  - Plan for a home sleep study in the fall to assess any potential impact on asthma.    Anxiety  Experienced a lapse in anxiety medication due to focus on a sinus infection. Resumed medication, which primarily aids with sleep. Xanax has not been used since September, indicating improved anxiety management.  - Continue Lexapro for anxiety management.  - Maintain Xanax as needed, noting it has not been used since September.    Iron deficiency anemia  Iron levels have improved, with a recent increase noted at the cancer center, indicating positive change from historically low levels.  - Order blood work to monitor iron levels.  - Continue current management and monitor for further improvement.    Obstructive sleep apnea, adult  Home sleep study has not been completed, preferring to wait for cooler temperatures in the fall.  - Order home sleep study in the fall.    General Health Maintenance  Yearly COVID vaccination is recommended in the fall due to asthma.  - Administer COVID vaccination in  the fall.    Problem List Items Addressed This Visit       Anxiety    Relevant Orders    Follow Up In [facility] - PCP - Established    Asthma    Relevant Orders    Follow Up In [facility] - PCP - Established    DUB (dysfunctional uterine bleeding)    Relevant Orders    Follow Up In [facility] - PCP - Established    GERD (gastroesophageal reflux disease)    Relevant Orders    Follow Up In [facility] - PCP - Established    Iron deficiency anemia    Relevant Orders    CBC and Auto Differential    Ferritin    Iron and TIBC    Follow Up In [facility] - PCP - Established    Obstructive sleep apnea, adult    Relevant Orders    Follow Up In [facility] - PCP - Established    Vitamin D deficiency - Primary    Relevant Orders    Vitamin D 25-Hydroxy,Total (for eval of Vitamin D levels)    Follow Up In [facility] - PCP - Established    Obesity, Class III, BMI 40-49.9 (morbid obesity) (Multi)    Relevant Orders    Follow Up In [facility] - PCP - Established     Other Visit Diagnoses         Bronchitis        Relevant Orders    Follow Up In [facility] - PCP - Established      Mixed hyperlipidemia        Relevant Orders    Lipid Panel    Comprehensive Metabolic Panel    Follow Up In [facility] - PCP - Established           Take carafate  Carafate helping     Also take lexapro     Consider U/S GB next     seeing Dr. Lea      iron def anemia     xanax prn   using humidifier       get home sleep study in fall 2025     Consider adding singular next     Follow up 1 month     Bw next      Consider neurology in future for autism spectrum    Continue current medications and therapy for chronic medical conditions     I have personally reviewed the [facility] report with the patient and have considered the risk of abuse, addiction, dependence and diversion.     Patient's use of medication is allowing patient to be able to perform  ADL's. Patient is always being evaluated for the possibility of lowering the medication dosage.       Lambert Bell MD       This medical note was created with the assistance of artificial intelligence (AI) for documentation purposes. The content has been reviewed and confirmed by the healthcare provider for accuracy and completeness. Patient consented to the use of audio recording and use of AI during their visit.

## 2025-04-21 ENCOUNTER — TELEPHONE (OUTPATIENT)
Dept: PRIMARY CARE | Facility: CLINIC | Age: 40
End: 2025-04-21
Payer: COMMERCIAL

## 2025-04-21 NOTE — TELEPHONE ENCOUNTER
Dr Bell pt    Pt phoned office and stated her birth control stopped working and she has been bleeding for 9 days and wants a new birth control called over.    [facility]

## 2025-04-24 ENCOUNTER — TELEMEDICINE (OUTPATIENT)
Dept: PRIMARY CARE | Facility: CLINIC | Age: 40
End: 2025-04-24
Payer: COMMERCIAL

## 2025-04-24 DIAGNOSIS — E55.9 VITAMIN D DEFICIENCY: ICD-10-CM

## 2025-04-24 DIAGNOSIS — E66.01 OBESITY, CLASS III, BMI 40-49.9 (MORBID OBESITY) (MULTI): ICD-10-CM

## 2025-04-24 DIAGNOSIS — N93.8 DUB (DYSFUNCTIONAL UTERINE BLEEDING): ICD-10-CM

## 2025-04-24 DIAGNOSIS — Z78.9 USES BIRTH CONTROL: Primary | ICD-10-CM

## 2025-04-24 DIAGNOSIS — G47.33 OBSTRUCTIVE SLEEP APNEA, ADULT: ICD-10-CM

## 2025-04-24 PROCEDURE — 99213 OFFICE O/P EST LOW 20 MIN: CPT | Performed by: FAMILY MEDICINE

## 2025-04-25 NOTE — PROGRESS NOTES
"Subjective   Patient ID: Keri Lowe \"Carolyne" is a 39 y.o. adult who presents for Med Management (Patient reports that birth control medication needs to be discussed today. Patient reports that  every 2 years a new birth control medication needs to be prescribed due to changes in her menstrual cycle including excessive bleeding that will last up to or more than 13 days. Currently on Junel 30 mcg. ).  HPI  See above  Review of Systems  12 Systems have been reviewed as follows.  Constitutional: Fever, weight gain, weight loss, appetite change, night sweats, fatigue, chills.  Eyes : blurry, double vision, vision, loss, tearing, redness, pain, sensitivity to light, glaucoma.  Ears, nose, mouth, and throat: Hearing loss, ringing in the ears, ear pain, nasal congestion, nasal drainage, nosebleeds, mouth, throat, irritation tooth problem.  Cardiovascular :chest pain, pressure, heart racing, palpitations, sweating, leg swelling, high or low blood pressure  Pulmonary: Cough, yellow or green sputum, blood and sputum, shortness of breath, wheezing  Gastrointestinal: Nausea, vomiting, diarrhea, constipation, pain, blood in stool, or vomitus, heartburn, difficulty swallowing  Genitourinary: incontinence, abnormal bleeding, abnormal discharge, urinary frequency, urinary hesitancy, pain, impotence sexual problem, infection, urinary retention  Musculoskeletal: Pain, stiffness, joint, redness or warmth, arthritis, back pain, weakness, muscle wasting, sprain or fracture  Neuro: Weight weakness, dizziness, change in voice, change in taste change in vision, change in hearing, loss, or change of sensation, trouble walking, balance problems coordination problems, shaking, speech problem  Endocrine , cold or heat intolerance, blood sugar problem, weight gain or loss missed periods hot flashes, sweats, change in body hair, change in libido, increased thirst, increased urination  Heme/lymph: Swelling, bleeding, problem anemia, bruising, " enlarged lymph nodes  Allergic/immunologic: H. plus nasal drip, watery itchy eyes, nasal drainage, immunosuppressed  The above were reviewed and noted negative except as noted in HPI and Problem List.    Objective   Physical Exam  Constitutional: Well developed, well nourished, alert and in no acute distress       There were no vitals taken for this visit.  Lab Results   Component Value Date    WBC 12.1 (H) 11/27/2024    HGB 13.7 11/27/2024    HCT 43.7 11/27/2024    MCV 92 11/27/2024     11/27/2024       Assessment/Plan   Problem List Items Addressed This Visit       DUB (dysfunctional uterine bleeding)    Relevant Medications    norgestrel-ethinyl estradioL (Low-ogestrel,Cryselle) 0.3-30 mg-mcg tablet    Other Relevant Orders    Follow Up In [facility] - PCP - Established    Obstructive sleep apnea, adult    Relevant Orders    Follow Up In [facility] - PCP - Established    Vitamin D deficiency    Relevant Orders    Follow Up In [facility] - PCP - Established    Obesity, Class III, BMI 40-49.9 (morbid obesity) (Multi)    Relevant Orders    Follow Up In [facility] - PCP - Established     Other Visit Diagnoses         Uses birth control    -  Primary    Relevant Medications    norgestrel-ethinyl estradioL (Low-ogestrel,Cryselle) 0.3-30 mg-mcg tablet    Other Relevant Orders    Follow Up In [facility] - PCP - Established          Asthma  Asthma is well-controlled, with symptoms triggered by high temperatures and sunlight, limiting outdoor activities between 11 AM and 6 PM. Inhaler use has ceased since the sinus infection, indicating stable control.  - Avoid outdoor activities during high temperatures and sunlight exposure.  - Plan for a home sleep study in the fall to assess any potential impact on asthma.     Anxiety  Experienced a lapse in anxiety medication due to focus on a sinus infection. Resumed medication, which primarily aids with sleep. Xanax has  not been used since September, indicating improved anxiety management.  - Continue Lexapro for anxiety management.  - Maintain Xanax as needed, noting it has not been used since September.     Iron deficiency anemia  Iron levels have improved, with a recent increase noted at the cancer center, indicating positive change from historically low levels.  - Order blood work to monitor iron levels.  - Continue current management and monitor for further improvement.     Obstructive sleep apnea, adult  Home sleep study has not been completed, preferring to wait for cooler temperatures in the fall.  - Order home sleep study in the fall.     General Health Maintenance  Yearly COVID vaccination is recommended in the fall due to asthma.  - Administer COVID vaccination in the fall.      Continue current medications and therapy for chronic medical conditions    Virtual Visit - Audio and Visual Communication Real Time              Take carafate  Carafate helping     Also take lexapro     Consider U/S GB next     seeing Dr. Lea      iron def anemia     xanax prn   using humidifier       get home sleep study in fall 2025     Consider adding singular next     Follow up 1 month     Bw next      Consider neurology in future for autism spectrum

## 2025-05-01 PROCEDURE — RXMED WILLOW AMBULATORY MEDICATION CHARGE

## 2025-05-05 ENCOUNTER — PHARMACY VISIT (OUTPATIENT)
Dept: PHARMACY | Facility: CLINIC | Age: 40
End: 2025-05-05
Payer: COMMERCIAL

## 2025-07-02 PROCEDURE — RXMED WILLOW AMBULATORY MEDICATION CHARGE

## 2025-07-10 ENCOUNTER — PHARMACY VISIT (OUTPATIENT)
Dept: PHARMACY | Facility: CLINIC | Age: 40
End: 2025-07-10
Payer: COMMERCIAL

## 2025-07-31 ENCOUNTER — APPOINTMENT (OUTPATIENT)
Dept: PRIMARY CARE | Facility: CLINIC | Age: 40
End: 2025-07-31
Payer: COMMERCIAL

## 2025-08-04 DIAGNOSIS — F41.9 ANXIETY: ICD-10-CM

## 2025-08-04 NOTE — TELEPHONE ENCOUNTER
Recent Visits  Date Type Provider Dept   04[facility]/25 [facility]   Showing recent visits within past 180 days and meeting all other requirements  Future Appointments  Date Type Provider Dept   08/13/25 Appointment Lambert Bell MD [facility]   Showing future appointments within next 90 days and meeting all other requirements

## 2025-08-04 NOTE — TELEPHONE ENCOUNTER
Patient of DR. Bell    Refill request      Disp Refills Start End    ALPRAZolam (Xanax) 0.25 mg tablet 30 tablet 0 9/20/2024 5/18/2025    Sig - Route: Take 1 tablet (0.25 mg) by mouth 3 times a day as needed for anxiety. - oral      [id number] - [place], [place] - [address] AT [facility] Phone: [phone]   Fax: [phone]

## 2025-08-05 ENCOUNTER — TELEPHONE (OUTPATIENT)
Dept: PRIMARY CARE | Facility: CLINIC | Age: 40
End: 2025-08-05
Payer: COMMERCIAL

## 2025-08-05 NOTE — TELEPHONE ENCOUNTER
DR WEST PT    PT PHONED OFFICE AND WAS CHECKING THE STATUS OF HER PENDED     ALPRAZOLAM    [facility]

## 2025-08-06 DIAGNOSIS — F41.9 ANXIETY: ICD-10-CM

## 2025-08-07 RX ORDER — ALPRAZOLAM 0.25 MG/1
0.25 TABLET ORAL 3 TIMES DAILY PRN
Qty: 30 TABLET | Refills: 0 | Status: SHIPPED | OUTPATIENT
Start: 2025-08-07 | End: 2026-04-04

## 2025-08-07 NOTE — TELEPHONE ENCOUNTER
Recent Visits  No visits were found meeting these conditions.  Showing recent visits within past 90 days and meeting all other requirements  Future Appointments  Date Type Provider Dept   08/13/25 Appointment Lambert Bell MD [facility] [place] [id number]   Showing future appointments within next 90 days and meeting all other requirements

## 2025-08-08 RX ORDER — ALPRAZOLAM 0.25 MG/1
0.25 TABLET ORAL 3 TIMES DAILY PRN
Qty: 30 TABLET | OUTPATIENT
Start: 2025-08-08

## 2025-08-13 ENCOUNTER — APPOINTMENT (OUTPATIENT)
Dept: PRIMARY CARE | Facility: CLINIC | Age: 40
End: 2025-08-13
Payer: COMMERCIAL

## 2025-08-13 VITALS
HEART RATE: 88 BPM | WEIGHT: 185.2 LBS | OXYGEN SATURATION: 95 % | DIASTOLIC BLOOD PRESSURE: 76 MMHG | BODY MASS INDEX: 36.17 KG/M2 | SYSTOLIC BLOOD PRESSURE: 126 MMHG

## 2025-08-13 DIAGNOSIS — F41.9 ANXIETY: ICD-10-CM

## 2025-08-13 DIAGNOSIS — D50.8 OTHER IRON DEFICIENCY ANEMIA: ICD-10-CM

## 2025-08-13 DIAGNOSIS — N93.8 DUB (DYSFUNCTIONAL UTERINE BLEEDING): ICD-10-CM

## 2025-08-13 DIAGNOSIS — J45.909 ASTHMA, UNSPECIFIED ASTHMA SEVERITY, UNSPECIFIED WHETHER COMPLICATED, UNSPECIFIED WHETHER PERSISTENT (HHS-HCC): ICD-10-CM

## 2025-08-13 DIAGNOSIS — K21.9 GASTROESOPHAGEAL REFLUX DISEASE, UNSPECIFIED WHETHER ESOPHAGITIS PRESENT: ICD-10-CM

## 2025-08-13 DIAGNOSIS — E55.9 VITAMIN D DEFICIENCY: ICD-10-CM

## 2025-08-13 DIAGNOSIS — E78.2 MIXED HYPERLIPIDEMIA: ICD-10-CM

## 2025-08-13 DIAGNOSIS — Z78.9 USES BIRTH CONTROL: ICD-10-CM

## 2025-08-13 DIAGNOSIS — Z79.899 MEDICATION MANAGEMENT: ICD-10-CM

## 2025-08-13 DIAGNOSIS — J40 BRONCHITIS: ICD-10-CM

## 2025-08-13 DIAGNOSIS — G47.33 OBSTRUCTIVE SLEEP APNEA, ADULT: ICD-10-CM

## 2025-08-13 DIAGNOSIS — E66.813 OBESITY, CLASS III, BMI 40-49.9 (MORBID OBESITY): ICD-10-CM

## 2025-08-13 PROCEDURE — 99214 OFFICE O/P EST MOD 30 MIN: CPT | Performed by: FAMILY MEDICINE

## 2025-08-13 RX ORDER — FLUOXETINE 20 MG/1
20 CAPSULE ORAL DAILY
Qty: 30 CAPSULE | Refills: 2 | Status: SHIPPED | OUTPATIENT
Start: 2025-08-13 | End: 2025-11-11

## 2025-08-13 RX ORDER — ALPRAZOLAM 0.25 MG/1
0.25 TABLET ORAL 3 TIMES DAILY PRN
Qty: 20 TABLET | Refills: 0 | Status: SHIPPED | OUTPATIENT
Start: 2025-08-13 | End: 2026-04-10

## 2025-08-13 RX ORDER — SUCRALFATE 1 G/1
1 TABLET ORAL
Qty: 120 TABLET | Refills: 2 | Status: SHIPPED | OUTPATIENT
Start: 2025-08-13 | End: 2026-08-13

## 2025-08-27 ENCOUNTER — TELEPHONE (OUTPATIENT)
Dept: PRIMARY CARE | Facility: CLINIC | Age: 40
End: 2025-08-27
Payer: COMMERCIAL

## 2025-10-20 ENCOUNTER — APPOINTMENT (OUTPATIENT)
Dept: PRIMARY CARE | Facility: CLINIC | Age: 40
End: 2025-10-20
Payer: COMMERCIAL